# Patient Record
Sex: FEMALE | Race: WHITE | NOT HISPANIC OR LATINO | Employment: PART TIME | URBAN - METROPOLITAN AREA
[De-identification: names, ages, dates, MRNs, and addresses within clinical notes are randomized per-mention and may not be internally consistent; named-entity substitution may affect disease eponyms.]

---

## 2017-09-12 ENCOUNTER — ALLSCRIPTS OFFICE VISIT (OUTPATIENT)
Dept: OTHER | Facility: OTHER | Age: 62
End: 2017-09-12

## 2017-09-12 DIAGNOSIS — M99.83 OTHER BIOMECHANICAL LESIONS OF LUMBAR REGION: ICD-10-CM

## 2017-09-12 DIAGNOSIS — E66.01 MORBID (SEVERE) OBESITY DUE TO EXCESS CALORIES (HCC): ICD-10-CM

## 2017-09-12 DIAGNOSIS — L73.9 FOLLICULAR DISORDER: ICD-10-CM

## 2017-09-12 DIAGNOSIS — E07.9 DISORDER OF THYROID: ICD-10-CM

## 2017-11-01 DIAGNOSIS — E07.9 DISORDER OF THYROID: ICD-10-CM

## 2018-01-14 VITALS
WEIGHT: 263.13 LBS | BODY MASS INDEX: 39.88 KG/M2 | DIASTOLIC BLOOD PRESSURE: 78 MMHG | SYSTOLIC BLOOD PRESSURE: 140 MMHG | HEIGHT: 68 IN

## 2018-02-21 DIAGNOSIS — E03.9 HYPOTHYROIDISM, UNSPECIFIED TYPE: Primary | ICD-10-CM

## 2018-02-21 RX ORDER — LEVOTHYROXINE SODIUM 0.1 MG/1
100 TABLET ORAL DAILY
Qty: 90 TABLET | Refills: 1 | Status: SHIPPED | OUTPATIENT
Start: 2018-02-21 | End: 2018-12-19 | Stop reason: ALTCHOICE

## 2018-02-21 NOTE — TELEPHONE ENCOUNTER
Pt  States she does not feel the same on the Levothyroxine, wants brand name Synthroid 100 mcg to [facility], please sign/auth Rx

## 2018-03-09 DIAGNOSIS — E07.9 THYROID DISORDER: Primary | ICD-10-CM

## 2018-03-09 PROBLEM — E66.01 MORBID OBESITY (HCC): Status: ACTIVE | Noted: 2017-09-12

## 2018-03-09 PROBLEM — M48.061 LUMBAR FORAMINAL STENOSIS: Status: ACTIVE | Noted: 2017-09-12

## 2018-03-09 PROBLEM — H35.413 BILATERAL RETINAL LATTICE DEGENERATION: Status: ACTIVE | Noted: 2017-09-12

## 2018-03-09 RX ORDER — THYROID,PORK 15 MG
TABLET ORAL
Qty: 30 TABLET | Refills: 0 | Status: SHIPPED | OUTPATIENT
Start: 2018-03-09 | End: 2018-04-09 | Stop reason: SDUPTHER

## 2018-04-09 DIAGNOSIS — E07.9 THYROID DISORDER: ICD-10-CM

## 2018-04-10 RX ORDER — THYROID,PORK 15 MG
TABLET ORAL
Qty: 30 TABLET | Refills: 6 | Status: SHIPPED | OUTPATIENT
Start: 2018-04-10 | End: 2018-11-06 | Stop reason: SDUPTHER

## 2018-11-06 DIAGNOSIS — E07.9 THYROID DISORDER: ICD-10-CM

## 2018-11-06 RX ORDER — THYROID,PORK 15 MG
TABLET ORAL
Qty: 30 TABLET | Refills: 1 | Status: SHIPPED | OUTPATIENT
Start: 2018-11-06 | End: 2018-12-17 | Stop reason: SDUPTHER

## 2018-11-06 NOTE — TELEPHONE ENCOUNTER
Looks like the patient has not been here since September of last year  She is going to need an updated office visit  I will refill this prescription  Also it looks like a ordered blood work but I do not see it immediately in the chart it may have been done at [address] [place] [address] and the interface did not bring it into the new system  Can we check all scripts to see if lab was done?

## 2018-11-12 DIAGNOSIS — E07.9 THYROID DISORDER: Primary | ICD-10-CM

## 2018-11-12 NOTE — TELEPHONE ENCOUNTER
SCHEDULED FOR 12/17/18 and mailed lab slip for patient to have done prior to appointment      JANENE

## 2018-12-06 ENCOUNTER — APPOINTMENT (OUTPATIENT)
Dept: LAB | Age: 63
End: 2018-12-06
Payer: COMMERCIAL

## 2018-12-06 DIAGNOSIS — E07.9 THYROID DISORDER: ICD-10-CM

## 2018-12-06 LAB
ALBUMIN SERPL BCP-MCNC: 3.7 G/DL (ref 3.5–5)
ALP SERPL-CCNC: 78 U/L (ref 46–116)
ALT SERPL W P-5'-P-CCNC: 66 U/L (ref 12–78)
ANION GAP SERPL CALCULATED.3IONS-SCNC: 5 MMOL/L (ref 4–13)
AST SERPL W P-5'-P-CCNC: 40 U/L (ref 5–45)
BILIRUB SERPL-MCNC: 0.59 MG/DL (ref 0.2–1)
BUN SERPL-MCNC: 11 MG/DL (ref 5–25)
CALCIUM SERPL-MCNC: 9.7 MG/DL (ref 8.3–10.1)
CHLORIDE SERPL-SCNC: 105 MMOL/L (ref 100–108)
CHOLEST SERPL-MCNC: 237 MG/DL (ref 50–200)
CO2 SERPL-SCNC: 29 MMOL/L (ref 21–32)
CREAT SERPL-MCNC: 0.74 MG/DL (ref 0.6–1.3)
GFR SERPL CREATININE-BSD FRML MDRD: 86 ML/MIN/1.73SQ M
GLUCOSE P FAST SERPL-MCNC: 91 MG/DL (ref 65–99)
HDLC SERPL-MCNC: 48 MG/DL (ref 40–60)
LDLC SERPL CALC-MCNC: 154 MG/DL (ref 0–100)
NONHDLC SERPL-MCNC: 189 MG/DL
POTASSIUM SERPL-SCNC: 3.9 MMOL/L (ref 3.5–5.3)
PROT SERPL-MCNC: 7.1 G/DL (ref 6.4–8.2)
SODIUM SERPL-SCNC: 139 MMOL/L (ref 136–145)
T3FREE SERPL-MCNC: 2.92 PG/ML (ref 2.3–4.2)
T4 FREE SERPL-MCNC: 0.87 NG/DL (ref 0.76–1.46)
TRIGL SERPL-MCNC: 177 MG/DL
TSH SERPL DL<=0.05 MIU/L-ACNC: 3.45 UIU/ML (ref 0.36–3.74)

## 2018-12-06 PROCEDURE — 80053 COMPREHEN METABOLIC PANEL: CPT

## 2018-12-06 PROCEDURE — 80061 LIPID PANEL: CPT

## 2018-12-06 PROCEDURE — 84443 ASSAY THYROID STIM HORMONE: CPT

## 2018-12-06 PROCEDURE — 84481 FREE ASSAY (FT-3): CPT

## 2018-12-06 PROCEDURE — 84439 ASSAY OF FREE THYROXINE: CPT

## 2018-12-06 PROCEDURE — 36415 COLL VENOUS BLD VENIPUNCTURE: CPT

## 2018-12-17 ENCOUNTER — OFFICE VISIT (OUTPATIENT)
Dept: FAMILY MEDICINE CLINIC | Facility: CLINIC | Age: 63
End: 2018-12-17
Payer: COMMERCIAL

## 2018-12-17 VITALS
WEIGHT: 267 LBS | HEIGHT: 66 IN | SYSTOLIC BLOOD PRESSURE: 140 MMHG | BODY MASS INDEX: 42.91 KG/M2 | DIASTOLIC BLOOD PRESSURE: 80 MMHG

## 2018-12-17 DIAGNOSIS — E07.9 THYROID DISORDER: ICD-10-CM

## 2018-12-17 DIAGNOSIS — E66.01 MORBID OBESITY (HCC): ICD-10-CM

## 2018-12-17 DIAGNOSIS — Z23 ENCOUNTER FOR VACCINATION: ICD-10-CM

## 2018-12-17 DIAGNOSIS — M48.061 LUMBAR FORAMINAL STENOSIS: Primary | ICD-10-CM

## 2018-12-17 PROCEDURE — 3008F BODY MASS INDEX DOCD: CPT | Performed by: FAMILY MEDICINE

## 2018-12-17 PROCEDURE — 1036F TOBACCO NON-USER: CPT | Performed by: FAMILY MEDICINE

## 2018-12-17 PROCEDURE — 99214 OFFICE O/P EST MOD 30 MIN: CPT | Performed by: FAMILY MEDICINE

## 2018-12-17 RX ORDER — UBIDECARENONE 75 MG
CAPSULE ORAL
COMMUNITY

## 2018-12-17 RX ORDER — LEVOTHYROXINE AND LIOTHYRONINE 9.5; 2.25 UG/1; UG/1
15 TABLET ORAL DAILY
Qty: 110 TABLET | Refills: 0 | Status: SHIPPED | OUTPATIENT
Start: 2018-12-17 | End: 2019-05-29 | Stop reason: ALTCHOICE

## 2018-12-17 RX ORDER — ALBUTEROL SULFATE 90 UG/1
AEROSOL, METERED RESPIRATORY (INHALATION)
COMMUNITY
Start: 2014-02-24

## 2018-12-17 RX ORDER — LEVOTHYROXINE AND LIOTHYRONINE 9.5; 2.25 UG/1; UG/1
1 TABLET ORAL DAILY
COMMUNITY
Start: 2017-11-03 | End: 2018-12-17 | Stop reason: SDUPTHER

## 2018-12-17 RX ORDER — THIAMINE HCL 100 MG
TABLET ORAL
COMMUNITY

## 2018-12-17 RX ORDER — MELATONIN: COMMUNITY

## 2018-12-17 RX ORDER — ERGOCALCIFEROL (VITAMIN D2) 10 MCG
TABLET ORAL
COMMUNITY

## 2019-01-16 ENCOUNTER — TELEPHONE (OUTPATIENT)
Dept: FAMILY MEDICINE CLINIC | Facility: CLINIC | Age: 64
End: 2019-01-16

## 2019-01-17 ENCOUNTER — OFFICE VISIT (OUTPATIENT)
Dept: FAMILY MEDICINE CLINIC | Facility: CLINIC | Age: 64
End: 2019-01-17
Payer: COMMERCIAL

## 2019-01-17 VITALS
BODY MASS INDEX: 41.75 KG/M2 | DIASTOLIC BLOOD PRESSURE: 82 MMHG | HEIGHT: 67 IN | SYSTOLIC BLOOD PRESSURE: 138 MMHG | TEMPERATURE: 98.1 F | WEIGHT: 266 LBS

## 2019-01-17 DIAGNOSIS — J06.9 ACUTE URI: Primary | ICD-10-CM

## 2019-01-17 PROCEDURE — 1036F TOBACCO NON-USER: CPT | Performed by: NURSE PRACTITIONER

## 2019-01-17 PROCEDURE — 3008F BODY MASS INDEX DOCD: CPT | Performed by: NURSE PRACTITIONER

## 2019-01-17 PROCEDURE — 99213 OFFICE O/P EST LOW 20 MIN: CPT | Performed by: NURSE PRACTITIONER

## 2019-01-17 RX ORDER — DOXYCYCLINE 100 MG/1
100 TABLET ORAL 2 TIMES DAILY
Qty: 14 TABLET | Refills: 0 | Status: SHIPPED | OUTPATIENT
Start: 2019-01-17 | End: 2019-01-24

## 2019-01-17 RX ORDER — PREDNISONE 20 MG/1
40 TABLET ORAL DAILY
Qty: 6 TABLET | Refills: 0 | Status: SHIPPED | OUTPATIENT
Start: 2019-01-17 | End: 2019-01-20

## 2019-01-17 RX ORDER — DOXYCYCLINE 100 MG/1
100 TABLET ORAL 2 TIMES DAILY
Qty: 14 TABLET | Refills: 0 | Status: SHIPPED | OUTPATIENT
Start: 2019-01-17 | End: 2019-01-17 | Stop reason: SDUPTHER

## 2019-01-17 NOTE — PROGRESS NOTES
Assessment/Plan:    Acute URI  Will start patient on prednisone burst 40 mg daily for 3 days  It patient is to increase fluid intake  If patient has no improvement symptoms or any worsening symptoms over the next 1-2 days she can start doxycycline 100 mg twice daily for 7 days  Handout given to patient  Advised to use plain Mucinex  Please call the office if you are experiencing any worsening of symptoms or no symptom improvement  Diagnoses and all orders for this visit:    Acute URI  -     predniSONE 20 mg tablet; Take 2 tablets (40 mg total) by mouth daily for 3 days  -     Discontinue: doxycycline (ADOXA) 100 MG tablet; Take 1 tablet (100 mg total) by mouth 2 (two) times a day for 7 days  -     doxycycline (ADOXA) 100 MG tablet; Take 1 tablet (100 mg total) by mouth 2 (two) times a day for 7 days      Patient verbalizes understand and agrees with treatment plan  Subjective:        Patient ID: Dereck Roberts is a 58 y o  female  Chief Complaint   Patient presents with    Cough     with sinus pressure; rhinorhea, and body aches; started Tuesday       Patient presents to office today for upper respiratory infection symptoms  Patient states that she started feeling sick on Tuesday with body aches and got worse during the night  Patient states that she has lot of sinus pressure and tenderness  Patient states she has a productive cough  She is afraid that is moving to her chest  OTC medication has not been helpful so far  She states she feels slightly better today than yesterday  The following portions of the patient's history were reviewed and updated as appropriate: allergies, current medications, past family history, past social history and problem list     Review of Systems   Constitutional: Positive for chills  Negative for fever  HENT: Positive for congestion, ear pain (right side), postnasal drip, rhinorrhea, sinus pain and sinus pressure   Negative for ear discharge and sore throat  Eyes: Negative for pain and visual disturbance  Respiratory: Positive for cough and wheezing  Negative for chest tightness and shortness of breath  Cardiovascular: Negative for chest pain, palpitations and leg swelling  Gastrointestinal: Negative for abdominal pain, diarrhea, nausea and vomiting  Genitourinary: Negative for difficulty urinating and dysuria  Musculoskeletal: Positive for myalgias  Negative for arthralgias  Skin: Negative for color change and rash  Neurological: Negative for dizziness, syncope, numbness and headaches  Hematological: Negative for adenopathy  Psychiatric/Behavioral: Negative for agitation and behavioral problems  The patient is not nervous/anxious  Objective:  /82 (BP Location: Left arm, Patient Position: Sitting, Cuff Size: Large)   Temp 98 1 °F (36 7 °C) (Tympanic)   Ht 5' 6 5" (1 689 m)   Wt 121 kg (266 lb)   BMI 42 29 kg/m²      Physical Exam   Constitutional: She is oriented to person, place, and time  She appears well-developed  No distress  Morbidly obese   HENT:   Head: Normocephalic and atraumatic  Right Ear: External ear normal  No drainage, swelling or tenderness  Tympanic membrane is retracted  Tympanic membrane is not perforated and not erythematous  No middle ear effusion  Left Ear: External ear normal  No drainage, swelling or tenderness  Tympanic membrane is retracted  Tympanic membrane is not perforated and not erythematous  No middle ear effusion  Nose: Rhinorrhea present  Right sinus exhibits frontal sinus tenderness  Right sinus exhibits no maxillary sinus tenderness  Left sinus exhibits frontal sinus tenderness  Left sinus exhibits no maxillary sinus tenderness  Mouth/Throat: Posterior oropharyngeal erythema present  No oropharyngeal exudate or posterior oropharyngeal edema  Eyes: Conjunctivae and lids are normal  Right eye exhibits no discharge  Left eye exhibits no discharge     Neck: Normal range of motion  Neck supple  No tracheal deviation present  Cardiovascular: Normal rate and regular rhythm  No murmur heard  Pulmonary/Chest: Effort normal and breath sounds normal  No respiratory distress  She has no wheezes  Abdominal: Soft  Bowel sounds are normal  She exhibits no distension  There is no tenderness  There is no guarding  Musculoskeletal: Normal range of motion  She exhibits no edema or deformity  Lymphadenopathy:     She has no cervical adenopathy  Neurological: She is alert and oriented to person, place, and time  Coordination normal    Skin: Skin is warm and dry  No rash noted  She is not diaphoretic  No erythema  Psychiatric: She has a normal mood and affect  Her speech is normal and behavior is normal  Judgment and thought content normal  Cognition and memory are normal    Nursing note and vitals reviewed

## 2019-01-17 NOTE — PATIENT INSTRUCTIONS
Start prednisone, this is the steroid  40mg daily for 3 days  Take with food  It's better to take it earlier in the day than later as it could keep you up at night  Do not mix with NSAIDs such as aleve, ibuprofen, advil, motrin  Increase fluids  Use plain mucinex  If no improvement or any worsening symptoms over next 1-2 days, start doxycyline 100 mg twice daily for 7 days  Please call the office if you are experiencing any worsening of symptoms or no symptom improvement  Upper Respiratory Infection   AMBULATORY CARE:   An upper respiratory infection  is also called a common cold  It can affect your nose, throat, ears, and sinuses  Common signs and symptoms include the following:  Cold symptoms are usually worst for the first 3 to 5 days  You may have any of the following:  · Runny or stuffy nose    · Sneezing and coughing    · Sore throat or hoarseness    · Red, watery, and sore eyes    · Fatigue     · Chills and fever    · Headache, body aches, or sore muscles  Seek care immediately if:   · You have chest pain or trouble breathing  Contact your healthcare provider if:   · You have a fever over 102ºF (39°C)  · Your sore throat gets worse or you see white or yellow spots in your throat  · Your symptoms get worse after 3 to 5 days or your cold is not better in 14 days  · You have a rash anywhere on your skin  · You have large, tender lumps in your neck  · You have thick, green or yellow drainage from your nose  · You cough up thick yellow, green, or bloody mucus  · You have vomiting for more than 24 hours and cannot keep fluids down  · You have a bad earache  · You have questions or concerns about your condition or care  Treatment for a cold: There is no cure for the common cold  Colds are caused by viruses and do not get better with antibiotics  Most people get better in 7 to 14 days  You may continue to cough for 2 to 3 weeks   The following may help decrease your symptoms:  · Decongestants  help reduce nasal congestion and help you breathe more easily  If you take decongestant pills, they may make you feel restless or not able to sleep  Do not use decongestant sprays for more than a few days  · Cough suppressants  help reduce coughing  Ask your healthcare provider which type of cough medicine is best for you  · NSAIDs , such as ibuprofen, help decrease swelling, pain, and fever  NSAIDs can cause stomach bleeding or kidney problems in certain people  If you take blood thinner medicine, always ask your healthcare provider if NSAIDs are safe for you  Always read the medicine label and follow directions  · Acetaminophen  decreases pain and fever  It is available without a doctor's order  Ask how much to take and how often to take it  Follow directions  Read the labels of all other medicines you are using to see if they also contain acetaminophen, or ask your doctor or pharmacist  Acetaminophen can cause liver damage if not taken correctly  Do not use more than 4 grams (4,000 milligrams) total of acetaminophen in one day  Manage your cold:   · Rest as much as possible  Slowly start to do more each day  · Drink more liquids as directed  Liquids will help thin and loosen mucus so you can cough it up  Liquids will also help prevent dehydration  Liquids that help prevent dehydration include water, fruit juice, and broth  Do not drink liquids that contain caffeine  Caffeine can increase your risk for dehydration  Ask your healthcare provider how much liquid to drink each day  · Soothe a sore throat  Gargle with warm salt water  This helps your sore throat feel better  Make salt water by dissolving ¼ teaspoon salt in 1 cup warm water  You may also suck on hard candy or throat lozenges  You may use a sore throat spray  · Use a humidifier or vaporizer  Use a cool mist humidifier or a vaporizer to increase air moisture in your home   This may make it easier for you to breathe and help decrease your cough  · Use saline nasal drops as directed  These help relieve congestion  · Apply petroleum-based jelly around the outside of your nostrils  This can decrease irritation from blowing your nose  · Do not smoke  Nicotine and other chemicals in cigarettes and cigars can make your symptoms worse  They can also cause infections such as bronchitis or pneumonia  Ask your healthcare provider for information if you currently smoke and need help to quit  E-cigarettes or smokeless tobacco still contain nicotine  Talk to your healthcare provider before you use these products  Prevent spreading your cold to others:   · Try to stay away from other people during the first 2 to 3 days of your cold when it is more easily spread  · Do not share food or drinks  · Do not share hand towels with household members  · Wash your hands often, especially after you blow your nose  Turn away from other people and cover your mouth and nose with a tissue when you sneeze or cough  Follow up with your healthcare provider as directed:  Write down your questions so you remember to ask them during your visits  © 2017 [facility] Information is for End User's use only and may not be sold, redistributed or otherwise used for commercial purposes  All illustrations and images included in CareNotes® are the copyrighted property of A D A M , Inc  or [employer] Gonzalez  The above information is an  only  It is not intended as medical advice for individual conditions or treatments  Talk to your doctor, nurse or pharmacist before following any medical regimen to see if it is safe and effective for you

## 2019-01-18 DIAGNOSIS — Z12.39 SCREENING FOR BREAST CANCER: Primary | ICD-10-CM

## 2019-01-24 ENCOUNTER — TELEPHONE (OUTPATIENT)
Dept: FAMILY MEDICINE CLINIC | Facility: CLINIC | Age: 64
End: 2019-01-24

## 2019-01-24 NOTE — TELEPHONE ENCOUNTER
Patient's ear is blocked, she feels weak and is going through hot/cold flashes  She finished the antibiotic and and is wondering if there is anything that can be done

## 2019-01-24 NOTE — TELEPHONE ENCOUNTER
Pt refuses to come in due to not being able to afford the co pay and visit bill  Fred ramesh pt is to try flonase and to update us tomorrow afternoon

## 2019-04-16 DIAGNOSIS — Z12.11 SCREEN FOR COLON CANCER: Primary | ICD-10-CM

## 2019-05-29 ENCOUNTER — OFFICE VISIT (OUTPATIENT)
Dept: FAMILY MEDICINE CLINIC | Facility: CLINIC | Age: 64
End: 2019-05-29
Payer: COMMERCIAL

## 2019-05-29 VITALS
HEIGHT: 66 IN | HEART RATE: 97 BPM | WEIGHT: 259 LBS | BODY MASS INDEX: 41.62 KG/M2 | DIASTOLIC BLOOD PRESSURE: 78 MMHG | OXYGEN SATURATION: 95 % | SYSTOLIC BLOOD PRESSURE: 128 MMHG

## 2019-05-29 DIAGNOSIS — E78.01 FAMILIAL HYPERCHOLESTEROLEMIA: ICD-10-CM

## 2019-05-29 DIAGNOSIS — F34.1 DYSTHYMIA: ICD-10-CM

## 2019-05-29 DIAGNOSIS — Z00.00 ENCOUNTER FOR WELL ADULT EXAM WITHOUT ABNORMAL FINDINGS: ICD-10-CM

## 2019-05-29 DIAGNOSIS — E07.9 THYROID DISORDER: Primary | ICD-10-CM

## 2019-05-29 DIAGNOSIS — Z23 ENCOUNTER FOR VACCINATION: ICD-10-CM

## 2019-05-29 PROCEDURE — 1101F PT FALLS ASSESS-DOCD LE1/YR: CPT | Performed by: FAMILY MEDICINE

## 2019-05-29 PROCEDURE — 99214 OFFICE O/P EST MOD 30 MIN: CPT | Performed by: FAMILY MEDICINE

## 2019-05-29 PROCEDURE — 86580 TB INTRADERMAL TEST: CPT

## 2019-05-29 PROCEDURE — 3008F BODY MASS INDEX DOCD: CPT | Performed by: FAMILY MEDICINE

## 2019-05-29 RX ORDER — LEVOTHYROXINE SODIUM 0.1 MG/1
TABLET ORAL
COMMUNITY
End: 2019-05-29 | Stop reason: SDUPTHER

## 2019-05-29 RX ORDER — LEVOTHYROXINE SODIUM 0.05 MG/1
50 TABLET ORAL 2 TIMES DAILY
Qty: 180 TABLET | Refills: 1 | Status: SHIPPED | OUTPATIENT
Start: 2019-05-29 | End: 2020-01-16 | Stop reason: SDUPTHER

## 2019-05-31 ENCOUNTER — CLINICAL SUPPORT (OUTPATIENT)
Dept: FAMILY MEDICINE CLINIC | Facility: CLINIC | Age: 64
End: 2019-05-31

## 2019-05-31 DIAGNOSIS — Z11.1 ENCOUNTER FOR PPD SKIN TEST READING: Primary | ICD-10-CM

## 2019-05-31 LAB
INDURATION: 0 MM
TB SKIN TEST: NEGATIVE

## 2019-10-10 ENCOUNTER — OFFICE VISIT (OUTPATIENT)
Dept: FAMILY MEDICINE CLINIC | Facility: CLINIC | Age: 64
End: 2019-10-10
Payer: COMMERCIAL

## 2019-10-10 VITALS
TEMPERATURE: 98.9 F | DIASTOLIC BLOOD PRESSURE: 82 MMHG | SYSTOLIC BLOOD PRESSURE: 124 MMHG | OXYGEN SATURATION: 99 % | HEART RATE: 79 BPM | WEIGHT: 263 LBS | HEIGHT: 66 IN | BODY MASS INDEX: 42.27 KG/M2

## 2019-10-10 DIAGNOSIS — J06.9 ACUTE URI: Primary | ICD-10-CM

## 2019-10-10 PROCEDURE — 3008F BODY MASS INDEX DOCD: CPT | Performed by: NURSE PRACTITIONER

## 2019-10-10 PROCEDURE — 99213 OFFICE O/P EST LOW 20 MIN: CPT | Performed by: NURSE PRACTITIONER

## 2019-10-10 RX ORDER — DOXYCYCLINE 100 MG/1
100 TABLET ORAL 2 TIMES DAILY
Qty: 14 TABLET | Refills: 0 | Status: SHIPPED | OUTPATIENT
Start: 2019-10-10 | End: 2019-10-17

## 2019-10-10 RX ORDER — PREDNISONE 20 MG/1
40 TABLET ORAL DAILY
Qty: 6 TABLET | Refills: 0 | Status: SHIPPED | OUTPATIENT
Start: 2019-10-10 | End: 2019-10-13

## 2019-10-10 NOTE — PATIENT INSTRUCTIONS
Start prednisone, this is the steroid  40mg daily for 3 days  Take with food  It's better to take it earlier in the day than later as it could keep you up at night  Do not mix with NSAIDs such as aleve, ibuprofen, advil, motrin  If no improvement or worsening symptoms over next few days, Start antibiotic, this is doxycycline 100 mg twice daily for 7 days  Take antibiotic with food  If you take any vitamin supplements, try to separate dose by 4-6 hours as this may impair absorption of the antibiotic  Drink lots of water  Plain mucinex would be helpful  Please call the office if you are experiencing any worsening of symptoms or no symptom improvement

## 2019-10-10 NOTE — PROGRESS NOTES
Assessment/Plan:    Acute URI  Advised likely viral in etiology  dicussed symptom management  Start prednisone, this is the steroid  40mg daily for 3 days  Take with food  It's better to take it earlier in the day than later as it could keep you up at night  Do not mix with NSAIDs such as aleve, ibuprofen, advil, motrin  If no improvement or worsening symptoms over next few days, Start antibiotic, this is doxycycline 100 mg twice daily for 7 days  Take antibiotic with food  If you take any vitamin supplements, try to separate dose by 4-6 hours as this may impair absorption of the antibiotic  Drink lots of water  Plain mucinex would be helpful  Please call the office if you are experiencing any worsening of symptoms or no symptom improvement  Diagnoses and all orders for this visit:    Acute URI  -     doxycycline (ADOXA) 100 MG tablet; Take 1 tablet (100 mg total) by mouth 2 (two) times a day for 7 days  -     predniSONE 20 mg tablet; Take 2 tablets (40 mg total) by mouth daily for 3 days      Patient verbalizes understand and agrees with treatment plan  Subjective:        Patient ID: Sherry Christianson is a 71 y o  female  Chief Complaint   Patient presents with    Sore Throat     with fatigue and runny nose; no f/c/n/v/d indicated  symptoms started 2 days ago  taking Advil PM and Nyquil PRN       Patient presents with:  Sore Throat: with fatigue and runny nose; no f/c/n/v/d indicated  symptoms started 2 days ago  taking Advil PM and Nyquil PRN  She feels she usually puts illnesses off and they get worse so she is seeking care earlier this time  Every season she states she gets either ear infection/ bronchitis/ or sinus infection  Sore Throat    This is a new problem  The current episode started in the past 7 days  The problem has been gradually worsening  The pain is worse on the left side  There has been no fever  The pain is at a severity of 8/10   Associated symptoms include congestion and coughing  Pertinent negatives include no abdominal pain, diarrhea, ear pain, headaches, shortness of breath or vomiting  The following portions of the patient's history were reviewed and updated as appropriate: allergies, current medications, past family history, past social history and problem list     Review of Systems   Constitutional: Positive for fatigue  Negative for chills and fever  HENT: Positive for congestion, rhinorrhea, sinus pressure, sinus pain and sore throat  Negative for ear pain  Eyes: Negative for pain and visual disturbance  Respiratory: Positive for cough  Negative for shortness of breath  Cardiovascular: Negative for chest pain, palpitations and leg swelling  Gastrointestinal: Negative for abdominal pain, diarrhea, nausea and vomiting  Genitourinary: Negative for difficulty urinating and dysuria  Musculoskeletal: Negative for arthralgias and myalgias  Skin: Negative for color change and rash  Neurological: Negative for dizziness, syncope, numbness and headaches  Hematological: Negative for adenopathy  Psychiatric/Behavioral: Negative for agitation and behavioral problems  The patient is not nervous/anxious  Objective:  /82 (BP Location: Left arm, Patient Position: Sitting, Cuff Size: Standard) Comment (BP Location): lower arm  Pulse 79   Temp 98 9 °F (37 2 °C) (Temporal)   Ht 5' 6 25" (1 683 m)   Wt 119 kg (263 lb)   SpO2 99%   BMI 42 13 kg/m²      Physical Exam   Constitutional: She is oriented to person, place, and time  She appears well-developed  No distress  Morbidly obese   HENT:   Head: Normocephalic and atraumatic  Right Ear: External ear normal  Tympanic membrane is bulging  Left Ear: External ear normal  Tympanic membrane is bulging  Nose: Right sinus exhibits maxillary sinus tenderness  Right sinus exhibits no frontal sinus tenderness  Left sinus exhibits maxillary sinus tenderness   Left sinus exhibits no frontal sinus tenderness  Mouth/Throat: Posterior oropharyngeal erythema present  No oropharyngeal exudate or posterior oropharyngeal edema  Tonsils are 2+ on the right  Tonsils are 2+ on the left  No tonsillar exudate  Eyes: Conjunctivae and lids are normal  Right eye exhibits no discharge  Left eye exhibits no discharge  Neck: Neck supple  No tracheal deviation present  Cardiovascular: Normal rate and regular rhythm  No murmur heard  Pulmonary/Chest: Effort normal and breath sounds normal  No respiratory distress  She has no wheezes  Musculoskeletal: She exhibits no edema or deformity  Lymphadenopathy:     She has no cervical adenopathy  Neurological: She is alert and oriented to person, place, and time  Skin: Skin is warm and dry  No rash noted  She is not diaphoretic  No erythema  Psychiatric: She has a normal mood and affect  Her speech is normal and behavior is normal  Judgment and thought content normal  Cognition and memory are normal    Nursing note and vitals reviewed

## 2019-11-22 ENCOUNTER — TELEPHONE (OUTPATIENT)
Dept: FAMILY MEDICINE CLINIC | Facility: CLINIC | Age: 64
End: 2019-11-22

## 2019-11-22 NOTE — TELEPHONE ENCOUNTER
Jena Mccord called the office she was trying to avoid doing physical therapy for her back issues it was originally  ordered but it   Dr Johnston can you please re order physical therapy for pt  Per pt she does not know if it will help but she is thinking she may have to give it a try  Pt is going to call her insuracne to see if she can go to [address] [place] [address] for physical therapy pt will call back to let us know   Pt's number is [phone] Additional Primary Defect Length (In Cm): 0.8

## 2019-11-27 NOTE — TELEPHONE ENCOUNTER
Charlesetta Mortimer returned the office's call she called her insuracne and they gave her 3 different names for where she can go forphysical therapy  Pt stated that they recommended Dr Kuo in the area and that they would need to request all of her records for what has been done with her back in the past informed pt she would need to sign a records release  Pt stated that financially she can not afford the co-pays  So she would like to hold off at this time she was asking whom Dr Johnston originally referred her to and if Dr Johnston knows if regis morgan will cover physical therapy at [facility]  I informed her I do not believe Dr Johnston would be able to answer this  I informed her that her  insurance would be the one whom would know where she may go to have physical therapy done and where it would be covered  Pt would like to hold off at this time  Pt has been doing physical therapy off of [facility] and using her stability ball if anything changes and per pt if she loses more mobility she will call and  let us know if she would like to move forward with physical therapy

## 2019-11-27 NOTE — TELEPHONE ENCOUNTER
lmom requesting pt please return the office's call  I need to see if pt called and asked her insurance if they will cover her to go to [facility] for physical therapy

## 2019-12-12 NOTE — PROGRESS NOTES
Assessment/Plan:     Diagnoses and all orders for this visit:    Lumbar foraminal stenosis  -     Ambulatory referral to Physical Therapy; Future    Thyroid disorder  -     thyroid (ARMOUR THYROID) 15 MG tablet; Take 1 tablet (15 mg total) by mouth daily Take extra half Monday Wednesday Friday  -     TSH, 3rd generation with Free T4 reflex; Future  -     T4, free; Future  -     T3, free; Future  -     T3, reverse; Future    Encounter for vaccination  -     TDAP VACCINE GREATER THAN OR EQUAL TO 8YO IM  -     PREFERRED: influenza vaccine, [id number], quadrivalent, recombinant, PF, 0 5 mL, for patients 18 yr+ ([facility])    Morbid obesity ([address] )    Other orders  -     ASTRAGALUS PO; Take by mouth  -     Multiple Vitamin (DAILY VALUE MULTIVITAMIN) TABS; Take by mouth  -     Magnesium 500 MG TABS; Take by mouth  -     Glucosamine-Chondroitin (OSTEO BI-FLEX REGULAR STRENGTH) 250-200 MG TABS; Take by mouth  -     Selenium 200 MCG CAPS; Take by oral route   -     GINSENG PO; Take by mouth  -     TURMERIC PO; turmeric (bulk)  -     albuterol (VENTOLIN HFA) 90 mcg/act inhaler; inhale 2 puff by inhalation route  every 4 - 6 hours as needed  -     cyanocobalamin (VITAMIN B-12) 100 mcg tablet; Take by mouth  -     cholecalciferol (VITAMIN D3) 1,000 units tablet; Take by mouth  -     Discontinue: thyroid (ARMOUR THYROID) 15 MG tablet; Take 1 tablet by mouth daily        1  Hypothyroid-patient is trending more hypo and adjust them it on her T3 agent is done today  Follow-up lab work in 3 months  2  History of degenerative lumbar disc-trial of ambulatory physical therapy and then if no success then refer to other chronic pain or back Orthopedic  3  Morbid obesity-discussed [id number] calorie portion control healthy intake  Exercises difficult due to back pain  Hopefully we can achieve improvement in that area so she can get back moving  Formal follow-up pending results of physical therapy and lab work    Time spent greater than 25 minutes with greater than 50% counseling performed  Subjective:   Chief Complaint   Patient presents with    Follow-up     here to go over labs  Has been having leg issues also, restless legs she states  Patient ID: Meena Pichardo is a 58 y o  female  Patient is a pleasant 27-year-old female who is here for follow-up and review labs  She has several complaints which will be outlined  The most significant issue is back pain  There is a copy of an MRI done about almost 2 years ago ordered by Augusto Aden with [facility]  Findings at that time indicated multilevel degenerative changes worse at L5-S1 where there was a broad-based disc bulge with degenerative changes of the facets  There was bilateral neural foraminal stenosis right greater than left  Patient also had repeat blood work done comparison to September of 2013 through [facility] labs  Lipid profile is improved  Hemoglobin A1c at that time was 5 3%, TSH was 1 0 free T4 was 1 66 and free T3 was 3 78  LDL was 209, triglycerides 183 and HDL of 49 and total cholesterol of 295  TSH comparatively to last year is looking trending more sluggish  Patient is not doing levothyroxine but has been doing Armor thyroid  She takes multiple supplements  Along with back pain she is having restless leg type symptoms  She notes that the did not have any formal physical therapy for her back symptoms  She is definitely interested in that  There are no notes  From [facility] that are in her record  We were not seeing her at that time     Patient declines flu or tetanus booster  The following portions of the patient's his tory were reviewed and updated as appropriate: allergies, current medications, past family history, past medical history, past social history, past surgical history and problem list       Review of Systems   Constitutional: Positive for fatigue  HENT: Negative for sinus pain  Eyes: Negative for visual disturbance     Respiratory: Negative for cough and shortness of breath  Cardiovascular: Negative for leg swelling  Gastrointestinal: Negative  Genitourinary: Negative  Musculoskeletal: Positive for arthralgias, back pain and gait problem (Patient's leg earlier bothersome when she 1st gets up and then when she does too much)  Creaky sound left knee  Some bilateral radicular symptoms but not all the way down to ankle         Objective:  /80   Ht 5' 6" (1 676 m)   Wt 121 kg (267 lb)   BMI 43 09 kg/m²        Physical Exam   Constitutional: She is oriented to person, place, and time  She appears well-developed  Pleasant 13-year-old female who appears her stated age with a BMI of 43%   Eyes: Pupils are equal, round, and reactive to light  Neck: Normal range of motion  Cardiovascular: Normal rate, regular rhythm and intact distal pulses  No carotid, femoral or abdominal bruits   Pulmonary/Chest: Effort normal    Abdominal: Soft  Bowel sounds are normal    Musculoskeletal:   Negative straight leg raising but very tight hamstrings right greater than left some discomfort with external rotation in the iliotibial band right greater than left   Neurological: She is alert and oriented to person, place, and time  She displays normal reflexes  She exhibits normal muscle tone  Psychiatric: She has a normal mood and affect   Her behavior is normal  Judgment and thought content normal  Stable  Trend BMP  Avoid nephrotoxins

## 2019-12-27 ENCOUNTER — TELEPHONE (OUTPATIENT)
Dept: FAMILY MEDICINE CLINIC | Facility: CLINIC | Age: 64
End: 2019-12-27

## 2019-12-27 NOTE — TELEPHONE ENCOUNTER
Charlesetta Mortimer called the office in regards to her back has taken a turn for the negative  She is losing mobility she can not walk as good as she used to and has excruciating pain and pt was advised to start with physical therapy originally  by Dr Johnston  pt can not afford the $40 copay a few times a week  Pt last had an MRI in 2016 and  pt believes that something else is going on with her back pt is requesting if you can consult with Dr Johnston since she is out of the office can you order pt an MRI  Per pt Dr Johnston knows the situation she has a herniated  disc and degenerative  Pt would need an open Air MRI  Pt's number is [phone]       Dr Garcia pt aware Dr Johnston is currently out of the office  And all messages are going out for you to address and you can discuss with Dr Johnston if needed  Pt understood we will call hero once we have an answer pt understands she may not hear today

## 2019-12-30 NOTE — TELEPHONE ENCOUNTER
THIS IS LUMBAR MRI FROM 1/2017 , so almost 3 years ago----    At L2-3 there is minute disc bulging slightly paracentral to the right  There  is mild right neural foraminal stenosis  At L3-4 there is a mild disc bulging  There is mild bilateral neural foraminal  narrowing  At L4-5 there is a posterior disc bulge with perhaps a small right  extraforaminal zone disc protrusion  There are degenerative changes of the  facets  There is bilateral neural foraminal stenosis, right greater than left  At L5-S1 there is an anterior disc herniation  There is a broad-based posterior  disc bulge with degenerative changes of the facets  There is severe right  greater than left neural foraminal stenosis  There is some increased STIR  signal along the lateral aspect of the endplates likely representing some type  I degenerative endplate changes  There may be a subacute/acute change  IS pain radicular? ? Meaning down leg, which legs?

## 2020-01-16 DIAGNOSIS — E07.9 THYROID DISORDER: ICD-10-CM

## 2020-01-16 NOTE — TELEPHONE ENCOUNTER
Patient called back stating she still has not gotten her labs drawn as she is trying to get the best pricing as she has a large out of pocket pay but does need a refill

## 2020-01-17 RX ORDER — LEVOTHYROXINE SODIUM 0.05 MG/1
50 TABLET ORAL 2 TIMES DAILY
Qty: 180 TABLET | Refills: 1 | Status: SHIPPED | OUTPATIENT
Start: 2020-01-17 | End: 2020-06-11

## 2020-02-10 DIAGNOSIS — Z12.39 BREAST CANCER SCREENING: Primary | ICD-10-CM

## 2020-02-26 ENCOUNTER — TELEPHONE (OUTPATIENT)
Dept: FAMILY MEDICINE CLINIC | Facility: CLINIC | Age: 65
End: 2020-02-26

## 2020-02-26 NOTE — TELEPHONE ENCOUNTER
Patient went to [facility] for her foot and ankle issues  She was told by the doctor there that she needs a complete reconstruction of her foot and ankle  Recovery time is 6 months  She would like a second opinion  Who do you recommend?     [phone]

## 2020-02-26 NOTE — TELEPHONE ENCOUNTER
Kd Sharp MD with [facility]  His office is at the [facility] scan  He is the physician that did my foot surgery  He comes highly recommended    Definitely worth a 2nd opinion from him

## 2020-02-27 ENCOUNTER — APPOINTMENT (OUTPATIENT)
Dept: LAB | Age: 65
End: 2020-02-27
Payer: COMMERCIAL

## 2020-02-27 DIAGNOSIS — E07.9 THYROID DISORDER: ICD-10-CM

## 2020-02-27 DIAGNOSIS — E78.01 FAMILIAL HYPERCHOLESTEROLEMIA: ICD-10-CM

## 2020-02-27 LAB
ALBUMIN SERPL BCP-MCNC: 3.8 G/DL (ref 3.5–5)
ALP SERPL-CCNC: 79 U/L (ref 46–116)
ALT SERPL W P-5'-P-CCNC: 31 U/L (ref 12–78)
ANION GAP SERPL CALCULATED.3IONS-SCNC: 5 MMOL/L (ref 4–13)
AST SERPL W P-5'-P-CCNC: 24 U/L (ref 5–45)
BILIRUB SERPL-MCNC: 0.71 MG/DL (ref 0.2–1)
BUN SERPL-MCNC: 16 MG/DL (ref 5–25)
CALCIUM SERPL-MCNC: 9.4 MG/DL (ref 8.3–10.1)
CHLORIDE SERPL-SCNC: 109 MMOL/L (ref 100–108)
CHOLEST SERPL-MCNC: 287 MG/DL (ref 50–200)
CO2 SERPL-SCNC: 27 MMOL/L (ref 21–32)
CREAT SERPL-MCNC: 0.82 MG/DL (ref 0.6–1.3)
GFR SERPL CREATININE-BSD FRML MDRD: 75 ML/MIN/1.73SQ M
GLUCOSE P FAST SERPL-MCNC: 99 MG/DL (ref 65–99)
HDLC SERPL-MCNC: 53 MG/DL
LDLC SERPL CALC-MCNC: 216 MG/DL (ref 0–100)
POTASSIUM SERPL-SCNC: 4.2 MMOL/L (ref 3.5–5.3)
PROT SERPL-MCNC: 7.4 G/DL (ref 6.4–8.2)
SODIUM SERPL-SCNC: 141 MMOL/L (ref 136–145)
T3FREE SERPL-MCNC: 2.2 PG/ML (ref 2.3–4.2)
T4 FREE SERPL-MCNC: 1.27 NG/DL (ref 0.76–1.46)
TRIGL SERPL-MCNC: 91 MG/DL
TSH SERPL DL<=0.05 MIU/L-ACNC: 1.48 UIU/ML (ref 0.36–3.74)

## 2020-02-27 PROCEDURE — 80061 LIPID PANEL: CPT

## 2020-02-27 PROCEDURE — 84439 ASSAY OF FREE THYROXINE: CPT

## 2020-02-27 PROCEDURE — 36415 COLL VENOUS BLD VENIPUNCTURE: CPT

## 2020-02-27 PROCEDURE — 84481 FREE ASSAY (FT-3): CPT

## 2020-02-27 PROCEDURE — 84443 ASSAY THYROID STIM HORMONE: CPT

## 2020-02-27 PROCEDURE — 80053 COMPREHEN METABOLIC PANEL: CPT

## 2020-03-04 NOTE — TELEPHONE ENCOUNTER
Patient called the office this morning and was given the information  She is going to call to make an appt

## 2020-06-11 DIAGNOSIS — E07.9 THYROID DISORDER: ICD-10-CM

## 2020-06-11 RX ORDER — LEVOTHYROXINE SODIUM 0.05 MG/1
50 TABLET ORAL 2 TIMES DAILY
Qty: 180 TABLET | Refills: 1 | Status: SHIPPED | OUTPATIENT
Start: 2020-06-11 | End: 2020-06-12 | Stop reason: SDUPTHER

## 2020-06-12 RX ORDER — LEVOTHYROXINE SODIUM 0.1 MG/1
100 TABLET ORAL DAILY
Qty: 90 TABLET | Refills: 2 | OUTPATIENT
Start: 2020-06-12

## 2020-06-12 RX ORDER — LEVOTHYROXINE SODIUM 0.05 MG/1
50 TABLET ORAL 2 TIMES DAILY
Qty: 180 TABLET | Refills: 1 | Status: SHIPPED | OUTPATIENT
Start: 2020-06-12 | End: 2021-03-05

## 2020-06-12 NOTE — TELEPHONE ENCOUNTER
Pt called the office back and states that she does not want to take the 100mcg because they put a dye in the 100mcg that is not in the 50mcg  So pt would like to stay at the 50mcg twice daily   Please advise, thanks

## 2021-03-05 DIAGNOSIS — E07.9 THYROID DISORDER: ICD-10-CM

## 2021-03-05 RX ORDER — LEVOTHYROXINE SODIUM 0.05 MG/1
TABLET ORAL
Qty: 180 TABLET | Refills: 1 | Status: SHIPPED | OUTPATIENT
Start: 2021-03-05

## 2021-06-25 ENCOUNTER — VBI (OUTPATIENT)
Dept: ADMINISTRATIVE | Facility: OTHER | Age: 66
End: 2021-06-25

## 2024-02-13 ENCOUNTER — TELEPHONE (OUTPATIENT)
Age: 69
End: 2024-02-13

## 2024-02-13 NOTE — TELEPHONE ENCOUNTER
Patient asked if your office offer the 2 step tuberculin skin test. Patient's place of employment is requiring it. Patient would like a return call at [phone]. Please advise.

## 2024-02-13 NOTE — TELEPHONE ENCOUNTER
I called and spoke with the patient and made her aware that we do, do the 2 step PPD in the office. She will verify with employer and give us a call back to schedule if needed.

## 2024-02-15 ENCOUNTER — TELEPHONE (OUTPATIENT)
Age: 69
End: 2024-02-15

## 2024-02-15 NOTE — TELEPHONE ENCOUNTER
Patient called in and was confused on how the PPD placemen was scheduled. For the 1st and 2nd placement.    Please call Ara back at .    Thank you

## 2024-02-20 ENCOUNTER — CLINICAL SUPPORT (OUTPATIENT)
Dept: FAMILY MEDICINE CLINIC | Facility: CLINIC | Age: 69
End: 2024-02-20
Payer: COMMERCIAL

## 2024-02-20 DIAGNOSIS — Z11.1 SCREENING FOR TUBERCULOSIS: Primary | ICD-10-CM

## 2024-02-20 PROCEDURE — 86580 TB INTRADERMAL TEST: CPT

## 2024-02-22 ENCOUNTER — CLINICAL SUPPORT (OUTPATIENT)
Dept: FAMILY MEDICINE CLINIC | Facility: CLINIC | Age: 69
End: 2024-02-22

## 2024-02-22 DIAGNOSIS — Z11.1 SCREENING FOR TUBERCULOSIS: Primary | ICD-10-CM

## 2024-02-22 LAB
INDURATION: 0 MM
TB SKIN TEST: NEGATIVE

## 2024-03-05 ENCOUNTER — CLINICAL SUPPORT (OUTPATIENT)
Dept: FAMILY MEDICINE CLINIC | Facility: CLINIC | Age: 69
End: 2024-03-05
Payer: COMMERCIAL

## 2024-03-05 ENCOUNTER — RA CDI HCC (OUTPATIENT)
Dept: OTHER | Facility: HOSPITAL | Age: 69
End: 2024-03-05

## 2024-03-05 DIAGNOSIS — Z11.1 ENCOUNTER FOR PPD TEST: Primary | ICD-10-CM

## 2024-03-05 PROCEDURE — 86580 TB INTRADERMAL TEST: CPT

## 2024-03-05 NOTE — PROGRESS NOTES
F32.A Depression- found in active problem list - Can Depression be further classified using more specific code     HCC coding opportunities          Chart Reviewed number of suggestions sent to Provider: 1     Patients Insurance        Commercial Insurance: [employer]

## 2024-03-07 ENCOUNTER — CLINICAL SUPPORT (OUTPATIENT)
Dept: FAMILY MEDICINE CLINIC | Facility: CLINIC | Age: 69
End: 2024-03-07

## 2024-03-07 DIAGNOSIS — Z11.1 ENCOUNTER FOR PPD TEST: Primary | ICD-10-CM

## 2024-03-07 LAB
INDURATION: 0 MM
TB SKIN TEST: NEGATIVE

## 2024-03-11 ENCOUNTER — TELEPHONE (OUTPATIENT)
Age: 69
End: 2024-03-11

## 2024-03-11 NOTE — TELEPHONE ENCOUNTER
Patient asking for her dog to become an emotional support animal. Asking how to go about this. She will need him to travel with her in a year to [place] on a train.

## 2024-04-29 ENCOUNTER — TELEPHONE (OUTPATIENT)
Age: 69
End: 2024-04-29

## 2024-04-29 NOTE — TELEPHONE ENCOUNTER
Patient calling to discuss a bill for her TB read. She was billed $24 for her nurse visit to have her TB shot read. Advised I would have to transfer her call to a clerical member of the team to discuss. Upon warm transfer call was placed on hold for 7 minutes. When call was answered call disconnected. Called back and answered by Cele who told me to have patient bring a copy of her bill to the practice. I advised Cele she would have to relay this information tot he patient. She agreed and warm transfer was completed at this time.

## 2024-05-22 ENCOUNTER — TELEPHONE (OUTPATIENT)
Dept: OTHER | Facility: OTHER | Age: 69
End: 2024-05-22

## 2024-05-22 ENCOUNTER — TELEPHONE (OUTPATIENT)
Dept: FAMILY MEDICINE CLINIC | Facility: CLINIC | Age: 69
End: 2024-05-22

## 2024-05-22 ENCOUNTER — CLINICAL SUPPORT (OUTPATIENT)
Dept: FAMILY MEDICINE CLINIC | Facility: CLINIC | Age: 69
End: 2024-05-22
Payer: COMMERCIAL

## 2024-05-22 DIAGNOSIS — R39.9 UTI SYMPTOMS: Primary | ICD-10-CM

## 2024-05-22 LAB
SL AMB  POCT GLUCOSE, UA: ABNORMAL
SL AMB LEUKOCYTE ESTERASE,UA: ABNORMAL
SL AMB POCT BILIRUBIN,UA: ABNORMAL
SL AMB POCT BLOOD,UA: 1
SL AMB POCT CLARITY,UA: CLEAR
SL AMB POCT COLOR,UA: YELLOW
SL AMB POCT KETONES,UA: ABNORMAL
SL AMB POCT NITRITE,UA: ABNORMAL
SL AMB POCT PH,UA: 6
SL AMB POCT SPECIFIC GRAVITY,UA: 1.02
SL AMB POCT URINE PROTEIN: ABNORMAL
SL AMB POCT UROBILINOGEN: 0.2

## 2024-05-22 PROCEDURE — 87086 URINE CULTURE/COLONY COUNT: CPT | Performed by: FAMILY MEDICINE

## 2024-05-22 PROCEDURE — 81003 URINALYSIS AUTO W/O SCOPE: CPT

## 2024-05-22 PROCEDURE — 99211 OFF/OP EST MAY X REQ PHY/QHP: CPT

## 2024-05-22 RX ORDER — NITROFURANTOIN 25; 75 MG/1; MG/1
100 CAPSULE ORAL 2 TIMES DAILY
Qty: 6 CAPSULE | Refills: 0 | Status: SHIPPED | OUTPATIENT
Start: 2024-05-22 | End: 2024-05-25

## 2024-05-22 NOTE — TELEPHONE ENCOUNTER
----- Message from Nahed Venegas DO sent at 5/22/2024  4:20 PM EDT -----  Regarding: urine symptoms  Urine culture is sent as directed.  Will send in empiric 3-day treatment until final cultures.

## 2024-05-22 NOTE — TELEPHONE ENCOUNTER
FYI: patient is coming in for a nurse visit today  to give a urine sample. Patient believes she has a UTI.   Patient stated that she has a lot of pressure with frequent urination and a slight burn feeling.

## 2024-05-23 LAB — BACTERIA UR CULT: NORMAL

## 2024-05-24 ENCOUNTER — TELEPHONE (OUTPATIENT)
Age: 69
End: 2024-05-24

## 2024-05-24 NOTE — TELEPHONE ENCOUNTER
Patient asking for her urine lab results. Please review. She also states she was not supposed to be given the Macrobid due to her age. Asking if she should be on something else.

## 2024-08-02 ENCOUNTER — TELEPHONE (OUTPATIENT)
Dept: FAMILY MEDICINE CLINIC | Facility: CLINIC | Age: 69
End: 2024-08-02

## 2024-08-02 NOTE — TELEPHONE ENCOUNTER
I called and made the patient aware she verbalized understanding. She will go and have it looked at

## 2024-08-02 NOTE — TELEPHONE ENCOUNTER
Patient has a possible tick bite that she would like to get look at. She stated it is red and swollen. Please advise.

## 2024-10-09 ENCOUNTER — OFFICE VISIT (OUTPATIENT)
Dept: FAMILY MEDICINE CLINIC | Facility: CLINIC | Age: 69
End: 2024-10-09
Payer: COMMERCIAL

## 2024-10-09 VITALS
HEART RATE: 84 BPM | DIASTOLIC BLOOD PRESSURE: 80 MMHG | TEMPERATURE: 97.8 F | WEIGHT: 252 LBS | HEIGHT: 66 IN | RESPIRATION RATE: 16 BRPM | BODY MASS INDEX: 40.5 KG/M2 | OXYGEN SATURATION: 98 % | SYSTOLIC BLOOD PRESSURE: 142 MMHG

## 2024-10-09 DIAGNOSIS — R79.89 T3 LOW IN SERUM: ICD-10-CM

## 2024-10-09 DIAGNOSIS — E78.01 FAMILIAL HYPERCHOLESTEROLEMIA: ICD-10-CM

## 2024-10-09 DIAGNOSIS — R00.2 PALPITATIONS: Primary | ICD-10-CM

## 2024-10-09 DIAGNOSIS — E07.9 THYROID DISORDER: ICD-10-CM

## 2024-10-09 DIAGNOSIS — E66.01 CLASS 3 SEVERE OBESITY DUE TO EXCESS CALORIES WITH BODY MASS INDEX (BMI) OF 40.0 TO 44.9 IN ADULT, UNSPECIFIED WHETHER SERIOUS COMORBIDITY PRESENT (HCC): ICD-10-CM

## 2024-10-09 DIAGNOSIS — E66.813 CLASS 3 SEVERE OBESITY DUE TO EXCESS CALORIES WITH BODY MASS INDEX (BMI) OF 40.0 TO 44.9 IN ADULT, UNSPECIFIED WHETHER SERIOUS COMORBIDITY PRESENT (HCC): ICD-10-CM

## 2024-10-09 DIAGNOSIS — I49.8 SINUS ARRHYTHMIA: ICD-10-CM

## 2024-10-09 DIAGNOSIS — R35.1 NOCTURIA: ICD-10-CM

## 2024-10-09 PROCEDURE — 93000 ELECTROCARDIOGRAM COMPLETE: CPT | Performed by: FAMILY MEDICINE

## 2024-10-09 PROCEDURE — 99214 OFFICE O/P EST MOD 30 MIN: CPT | Performed by: FAMILY MEDICINE

## 2024-10-09 NOTE — PROGRESS NOTES
Ambulatory Visit  Name: Ara Cabrales      : 1955      MRN: [id number]  Encounter Provider: Parmjit Bruce DO  Encounter Date: 10/9/2024   Encounter department: [facility]  Chief Complaint   Patient presents with    Follow-up     Pt states she starts w/ heart palpitations when she has to urinate at night . Pt calls it urine dumping      Patient Instructions   Check updated labs and see Cardiology for palpitations and sinus arrhythmia and elevated BP and also endo for abnormal thyroid labs in past. Hx of nocturia and rec checking for diabetes. Recheck BP in 1 month for BP check for elevated BP today. Wants to do lifestyle changes and try to lose weight.   Chief Complaint   Patient presents with    Follow-up     Pt states she starts w/ heart palpitations when she has to urinate at night . Pt calls it urine dumping      Patient Instructions   Check updated labs and see Cardiology for palpitations and sinus arrhythmia and elevated BP and also endo for abnormal thyroid labs in past. Hx of nocturia and rec checking for diabetes. Recheck BP in 1 month for BP check for elevated BP today. Wants to do lifestyle changes and try to lose weight.     Assessment & Plan  Thyroid disorder    Orders:    Ambulatory Referral to Endocrinology; Future    Comprehensive metabolic panel; Future    TSH, 3rd generation; Future    T4, free; Future    T3 low in serum    Orders:    Ambulatory Referral to Endocrinology; Future    Comprehensive metabolic panel; Future    TSH, 3rd generation; Future    T4, free; Future    Palpitations    Orders:    Comprehensive metabolic panel; Future    Hemoglobin A1C; Future    TSH, 3rd generation; Future    T4, free; Future    Ambulatory Referral to Cardiology; Future    Familial hypercholesterolemia    Orders:    Comprehensive metabolic panel; Future    Lipid Panel with Direct LDL reflex; Future    Ambulatory Referral to Cardiology; Future    Class 3 severe obesity due to  excess calories with body mass index (BMI) of 40.0 to 44.9 in adult, unspecified whether serious comorbidity present ([facility])      Orders:    Comprehensive metabolic panel; Future    Hemoglobin A1C; Future    Lipid Panel with Direct LDL reflex; Future    TSH, 3rd generation; Future    T4, free; Future    Ambulatory Referral to Cardiology; Future    Nocturia         Sinus arrhythmia    Orders:    Ambulatory Referral to Cardiology; Future    [unfilled]  History of Present Illness     Follow-up (Pt states she starts w/ heart palpitations when she has to urinate at night . Pt calls it urine dumping )         History obtained from : patient  Review of Systems   Constitutional: Negative.    HENT: Negative.     Eyes: Negative.    Respiratory: Negative.     Cardiovascular:  Positive for palpitations.   Gastrointestinal: Negative.    Endocrine: Negative.    Genitourinary: Negative.    Musculoskeletal: Negative.    Skin: Negative.    Allergic/Immunologic: Negative.    Neurological: Negative.    Hematological: Negative.    Psychiatric/Behavioral: Negative.       Current Outpatient Medications on File Prior to Visit   Medication Sig Dispense Refill    cholecalciferol (VITAMIN D3) 1,000 units tablet Take by mouth      cyanocobalamin (VITAMIN B-12) 100 mcg tablet Take by mouth      GINSENG PO Take by mouth      Glucosamine-Chondroitin (OSTEO BI-FLEX REGULAR STRENGTH) 250-200 MG TABS Take by mouth      Magnesium 500 MG TABS Take by mouth      Multiple Vitamin (DAILY VALUE MULTIVITAMIN) TABS Take by mouth      Selenium 200 MCG CAPS Take by oral route.      TURMERIC PO turmeric (bulk)      albuterol (VENTOLIN HFA) 90 mcg/act inhaler inhale 2 puff by inhalation route  every 4 - 6 hours as needed (Patient not taking: Reported on 10/9/2024)      ASTRAGALUS PO Take by mouth (Patient not taking: Reported on 10/9/2024)      levothyroxine 50 mcg tablet TAKE 1 TABLET BY MOUTH TWICE A DAY (Patient not taking: Reported on 10/9/2024) 180  "tablet 1     No current facility-administered medications on file prior to visit.          Objective     /80   Pulse 84   Temp 97.8 °F (36.6 °C) (Temporal)   Resp 16   Ht 5' 6\" (1.676 m)   Wt 114 kg (252 lb)   SpO2 98%   BMI 40.67 kg/m²     Physical Exam  Constitutional:       Appearance: She is well-developed. She is obese.   HENT:      Head: Normocephalic and atraumatic.      Right Ear: External ear normal.      Left Ear: External ear normal.      Nose: Nose normal.   Eyes:      Conjunctiva/sclera: Conjunctivae normal.      Pupils: Pupils are equal, round, and reactive to light.   Cardiovascular:      Rate and Rhythm: Normal rate and regular rhythm.      Pulses: Normal pulses.      Heart sounds: Normal heart sounds.   Pulmonary:      Effort: Pulmonary effort is normal.      Breath sounds: Normal breath sounds.   Musculoskeletal:         General: Normal range of motion.      Cervical back: Normal range of motion and neck supple.   Skin:     General: Skin is warm and dry.      Capillary Refill: Capillary refill takes less than 2 seconds.   Neurological:      General: No focal deficit present.      Mental Status: She is alert and oriented to person, place, and time. Mental status is at baseline.      Deep Tendon Reflexes: Reflexes are normal and symmetric.   Psychiatric:         Mood and Affect: Mood normal.         Behavior: Behavior normal.         Thought Content: Thought content normal.         Judgment: Judgment normal.       Administrative Statements   I have spent a total time of 30 minutes in caring for this patient on the day of the visit/encounter including Diagnostic results, Prognosis, Risks and benefits of tx options, Instructions for management, Patient and family education, Importance of tx compliance, Risk factor reductions, Impressions, Counseling / Coordination of care, Documenting in the medical record, Reviewing / ordering tests, medicine, procedures  , and Obtaining or reviewing " history  .

## 2024-10-09 NOTE — ASSESSMENT & PLAN NOTE
Orders:    Ambulatory Referral to Endocrinology; Future    Comprehensive metabolic panel; Future    TSH, 3rd generation; Future    T4, free; Future

## 2024-10-09 NOTE — PATIENT INSTRUCTIONS
Check updated labs and see Cardiology for palpitations and sinus arrhythmia and elevated BP and also endo for abnormal thyroid labs in past. Hx of nocturia and rec checking for diabetes. Recheck BP in 1 month for BP check for elevated BP today. Wants to do lifestyle changes and try to lose weight.

## 2024-10-09 NOTE — ASSESSMENT & PLAN NOTE
Orders:    Comprehensive metabolic panel; Future    Lipid Panel with Direct LDL reflex; Future    Ambulatory Referral to Cardiology; Future

## 2024-10-11 ENCOUNTER — NURSE TRIAGE (OUTPATIENT)
Age: 69
End: 2024-10-11

## 2024-10-11 NOTE — TELEPHONE ENCOUNTER
"Reason for Disposition   History of hyperthyroidism or taking thyroid medication    Answer Assessment - Initial Assessment Questions  1. DESCRIPTION: \"Please describe your heart rate or heartbeat that you are having\" (e.g., fast/slow, regular/irregular, skipped or extra beats, \"palpitations\")      Patient does not monitor heart rate  2. ONSET: \"When did it start?\" (Minutes, hours or days)       Has been ongoing  3. DURATION: \"How long does it last\" (e.g., seconds, minutes, hours)      Lasts a few hours during the night.  4. PATTERN \"Does it come and go, or has it been constant since it started?\"  \"Does it get worse with exertion?\"   \"Are you feeling it now?\"      Not feeling it now.    6. HEART RATE: \"Can you tell me your heart rate?\" \"How many beats in 15 seconds?\"  (Note: not all patients can do this)        Patient does not monitor BP or HR at home  7       8. CAUSE: \"What do you think is causing the palpitations?\"      Abnormal EKG per patient as discussed with PCP at  two days ago     10. OTHER SYMPTOMS: \"Do you have any other symptoms?\" (e.g., dizziness, chest pain, sweating, difficulty breathing)        Other symptom is frequent urination during the night when she has the palpitations.    Protocols used: Heart Rate and Heartbeat Questions-ADULT-[place]    "

## 2024-10-14 NOTE — PROGRESS NOTES
"Advanced Heart Failure/Pulmonary Hypertension Outpatient Note - Ara Cabrales 69 y.o. female MRN: [id number]    @ Encounter: [id number]      Assessment:  69 y.o. female PMH and acute problems listed later in this note (a partial list may also be included within 'assessment' section) presents for consultation.  I first met Ara Cabrales on 10/15/24.  Referred by Parmjit Bruce DO.     Intentional weight loss 25 lbs in last few months  Sinus arrhythmia  nocturia  Patient Active Problem List   Diagnosis    Bilateral retinal lattice degeneration    Lumbar foraminal stenosis    Morbid obesity (HCC)    Thyroid disorder    Depression    Hyperlipidemia         Today I have reviewed all pertinent labs/imaging/data including but not limited to:    No results for input(s): \"CREATININE\" in the last 88306 hours.      Lab Results   Component Value Date    K 4.2 02/27/2020     No results found for: \"HGBA1C\"  Lab Results   Component Value Date    XEV4MZJCHGRQ 1.480 02/27/2020     Lab Results   Component Value Date    LDLCALC 216 (H) 02/27/2020     No results found for: \"BNP\"   No results found for: \"NTBNP\"       TODAY'S PLAN:     10/15/24  I am meeting patient for the first time today  +palpitations just prior to her nighttime 'urine dumping' episodes  Ecg reassuring, sinus arrhythmia    Urgent referral for palpitations    Check holter now    Fu PCP for mngmt of possible DM, thyroid dz, HTN  Multiple screening labs sent but not yet completed - pt plans to get them doen in near future    Applauded her efforts at weight loss and getting healthy now    Follow up:  With the general cardiology team in 6 weeks. This patient does not have advanced heart failure needs at present.  In addition to follow up with their other medical providers    Studies:  Today I have reviewed all pertinent patient data/labs/imaging where available, including but not limited to the below studies. This includes my independent interpretation. Selected results " may be displayed here but comprehensive listing is omitted for note clarity and can be found in the epic chart.    ECG.    Echo.    Stress.    Cath.    HPI:   69 y.o. female PMH and acute problems listed later in this note (a partial list may also be included within 'assessment' section) presents for consultation.  No new CP/SOB/dizziness/syncope.  No new fatigue.  No new unintentional weight changes.  No new leg swelling, PND, pillow orthopnea.  No new fevers, chills, cough, nausea, vomiting, diarrhea, dysuria.        ROS:  10 point ROS negative except as specified in HPI    No past medical history on file.  Patient Active Problem List   Diagnosis    Bilateral retinal lattice degeneration    Lumbar foraminal stenosis    Morbid obesity (HCC)    Thyroid disorder    Depression    Hyperlipidemia     No current facility-administered medications for this visit.      Allergies   Allergen Reactions    Sulfa Antibiotics Anaphylaxis and Hives     Category: Allergy;     Nuts - Food Allergy Hives     Category: Allergy; Annotation - [id number]: Tree nuts     Social History     Socioeconomic History    Marital status: /Civil Union     Spouse name: Not on file    Number of children: Not on file    Years of education: Not on file    Highest education level: Not on file   Occupational History    Occupation: employed   Tobacco Use    Smoking status: Former    Smokeless tobacco: Never    Tobacco comments:     over 30 years ago   Vaping Use    Vaping status: Never Used   Substance and Sexual Activity    Alcohol use: No    Drug use: No    Sexual activity: Not on file   Other Topics Concern    Not on file   Social History Narrative    Not on file     Social Determinants of Health     Financial Resource Strain: Not on file   Food Insecurity: Not on file   Transportation Needs: Not on file   Physical Activity: Not on file   Stress: Not on file   Social Connections: Not on file   Intimate Partner Violence: Not on file   Housing  "Stability: Not on file     Family History   Problem Relation Age of Onset    Gout Mother     Celiac disease Mother     Heart attack Maternal Grandfather     Diabetes type II Family     Gestational diabetes Sister        Physical Exam:  Vitals:    10/15/24 1108   BP: 142/92   BP Location: Right arm   Patient Position: Sitting   Cuff Size: Large   Pulse: 73   SpO2: 100%   Weight: 115 kg (253 lb 6.4 oz)   Height: 5' 6\" (1.676 m)     Constitutional: NAD, non toxic, obese  Ears/nose/mouth/throat: atraumatic  CV: RRR, nl S1S2, no murmurs/rubs/gallups, no JVD, no HJR  Resp: CTABL  GI: Soft, NTND  MSK: no swollen joints in exposed areas  Extr: No edema, warm LE  Pysche: Normal affect  Neuro: appropriate in conversation  Skin: dry and intact in exposed areas    Labs & Results:  No results found for: \"WBC\", \"HGB\", \"HCT\", \"MCV\", \"PLT\"  Lab Results   Component Value Date    SODIUM 141 02/27/2020    K 4.2 02/27/2020     (H) 02/27/2020    CO2 27 02/27/2020    BUN 16 02/27/2020    CREATININE 0.82 02/27/2020    CALCIUM 9.4 02/27/2020       Counseling / Coordination of Care  Greater than 50% of total time was spent with the patient and / or family counseling and / or coordination of care. Discussion included diagnoses, most recent studies and any changes in treatment.    Thank you for the opportunity to participate in the care of this patient.    Brandon Valeinte MD  Attending Physician  Advanced Heart Failure and Transplant Cardiology  [facility]  "

## 2024-10-15 ENCOUNTER — PROCEDURE VISIT (OUTPATIENT)
Dept: CARDIOLOGY CLINIC | Facility: CLINIC | Age: 69
End: 2024-10-15
Payer: COMMERCIAL

## 2024-10-15 ENCOUNTER — OFFICE VISIT (OUTPATIENT)
Dept: CARDIOLOGY CLINIC | Facility: CLINIC | Age: 69
End: 2024-10-15
Payer: COMMERCIAL

## 2024-10-15 VITALS
BODY MASS INDEX: 40.72 KG/M2 | OXYGEN SATURATION: 100 % | HEIGHT: 66 IN | WEIGHT: 253.4 LBS | SYSTOLIC BLOOD PRESSURE: 142 MMHG | DIASTOLIC BLOOD PRESSURE: 92 MMHG | HEART RATE: 73 BPM

## 2024-10-15 DIAGNOSIS — R00.2 PALPITATIONS: ICD-10-CM

## 2024-10-15 DIAGNOSIS — R00.2 PALPITATIONS: Primary | ICD-10-CM

## 2024-10-15 DIAGNOSIS — E66.813 CLASS 3 SEVERE OBESITY DUE TO EXCESS CALORIES WITH BODY MASS INDEX (BMI) OF 40.0 TO 44.9 IN ADULT, UNSPECIFIED WHETHER SERIOUS COMORBIDITY PRESENT (HCC): ICD-10-CM

## 2024-10-15 DIAGNOSIS — E66.01 CLASS 3 SEVERE OBESITY DUE TO EXCESS CALORIES WITH BODY MASS INDEX (BMI) OF 40.0 TO 44.9 IN ADULT, UNSPECIFIED WHETHER SERIOUS COMORBIDITY PRESENT (HCC): ICD-10-CM

## 2024-10-15 DIAGNOSIS — I49.8 SINUS ARRHYTHMIA: ICD-10-CM

## 2024-10-15 DIAGNOSIS — E78.01 FAMILIAL HYPERCHOLESTEROLEMIA: ICD-10-CM

## 2024-10-15 PROCEDURE — 99244 OFF/OP CNSLTJ NEW/EST MOD 40: CPT | Performed by: STUDENT IN AN ORGANIZED HEALTH CARE EDUCATION/TRAINING PROGRAM

## 2024-10-15 PROCEDURE — RECHECK: Performed by: STUDENT IN AN ORGANIZED HEALTH CARE EDUCATION/TRAINING PROGRAM

## 2024-10-15 NOTE — LETTER
"October 15, 2024     Parmjit Bruce DO  [address]  [address]  [facility] [address]    Patient: Ara Cabrales   YOB: 1955   Date of Visit: 10/15/2024       Dear Dr. Bruce:    Thank you for referring Ara Cabrales to me for evaluation. Below are my notes for this consultation.    If you have questions, please do not hesitate to call me. I look forward to following your patient along with you.         Sincerely,        Brandon Valiente MD        CC: No Recipients    Brandon Valiente MD  10/15/2024 11:43 AM  Sign when Signing Visit  Advanced Heart Failure/Pulmonary Hypertension Outpatient Note - Ara Cabrales 69 y.o. female MRN: [id number]    @ Encounter: [id number]      Assessment:  69 y.o. female PMH and acute problems listed later in this note (a partial list may also be included within 'assessment' section) presents for consultation.  I first met Ara Cabrales on 10/15/24.  Referred by Parmjit Bruce DO.     Intentional weight loss 25 lbs in last few months  Sinus arrhythmia  nocturia  Patient Active Problem List   Diagnosis   • Bilateral retinal lattice degeneration   • Lumbar foraminal stenosis   • Morbid obesity (HCC)   • Thyroid disorder   • Depression   • Hyperlipidemia         Today I have reviewed all pertinent labs/imaging/data including but not limited to:    No results for input(s): \"CREATININE\" in the last 15792 hours.      Lab Results   Component Value Date    K 4.2 02/27/2020     No results found for: \"HGBA1C\"  Lab Results   Component Value Date    [id number] 1.480 02/27/2020     Lab Results   Component Value Date    LDLCALC 216 (H) 02/27/2020     No results found for: \"BNP\"   No results found for: \"NTBNP\"       TODAY'S PLAN:     10/15/24  I am meeting patient for the first time today  +palpitations just prior to her nighttime 'urine dumping' episodes  Ecg reassuring, sinus arrhythmia    Urgent referral for palpitations    Check holter now    Fu PCP for mngmt of possible DM, thyroid dz, " HTN  Multiple screening labs sent but not yet completed - pt plans to get them doen in near future    Applauded her efforts at weight loss and getting healthy now    Follow up:  With the general cardiology team in 6 weeks. This patient does not have advanced heart failure needs at present.  In addition to follow up with their other medical providers    Studies:  Today I have reviewed all pertinent patient data/labs/imaging where available, including but not limited to the below studies. This includes my independent interpretation. Selected results may be displayed here but comprehensive listing is omitted for note clarity and can be found in the epic chart.    ECG.    Echo.    Stress.    Cath.    HPI:   69 y.o. female PMH and acute problems listed later in this note (a partial list may also be included within 'assessment' section) presents for consultation.  No new CP/SOB/dizziness/syncope.  No new fatigue.  No new unintentional weight changes.  No new leg swelling, PND, pillow orthopnea.  No new fevers, chills, cough, nausea, vomiting, diarrhea, dysuria.        ROS:  10 point ROS negative except as specified in HPI    No past medical history on file.  Patient Active Problem List   Diagnosis   • Bilateral retinal lattice degeneration   • Lumbar foraminal stenosis   • Morbid obesity (HCC)   • Thyroid disorder   • Depression   • Hyperlipidemia     No current facility-administered medications for this visit.      Allergies   Allergen Reactions   • Sulfa Antibiotics Anaphylaxis and Hives     Category: Allergy;    • Nuts - Food Allergy Hives     Category: Allergy; Annotation - [id number]: Tree nuts     Social History     Socioeconomic History   • Marital status: /Civil Union     Spouse name: Not on file   • Number of children: Not on file   • Years of education: Not on file   • Highest education level: Not on file   Occupational History   • Occupation: employed   Tobacco Use   • Smoking status: Former   • Smokeless  "tobacco: Never   • Tobacco comments:     over 30 years ago   Vaping Use   • Vaping status: Never Used   Substance and Sexual Activity   • Alcohol use: No   • Drug use: No   • Sexual activity: Not on file   Other Topics Concern   • Not on file   Social History Narrative   • Not on file     Social Determinants of Health     Financial Resource Strain: Not on file   Food Insecurity: Not on file   Transportation Needs: Not on file   Physical Activity: Not on file   Stress: Not on file   Social Connections: Not on file   Intimate Partner Violence: Not on file   Housing Stability: Not on file     Family History   Problem Relation Age of Onset   • Gout Mother    • Celiac disease Mother    • Heart attack Maternal Grandfather    • Diabetes type II Family    • Gestational diabetes Sister        Physical Exam:  Vitals:    10/15/24 1108   BP: 142/92   BP Location: Right arm   Patient Position: Sitting   Cuff Size: Large   Pulse: 73   SpO2: 100%   Weight: 115 kg (253 lb 6.4 oz)   Height: 5' 6\" (1.676 m)     Constitutional: NAD, non toxic, obese  Ears/nose/mouth/throat: atraumatic  CV: RRR, nl S1S2, no murmurs/rubs/gallups, no JVD, no HJR  Resp: CTABL  GI: Soft, NTND  MSK: no swollen joints in exposed areas  Extr: No edema, warm LE  Pysche: Normal affect  Neuro: appropriate in conversation  Skin: dry and intact in exposed areas    Labs & Results:  No results found for: \"WBC\", \"HGB\", \"HCT\", \"MCV\", \"PLT\"  Lab Results   Component Value Date    SODIUM 141 02/27/2020    K 4.2 02/27/2020     (H) 02/27/2020    CO2 27 02/27/2020    BUN 16 02/27/2020    CREATININE 0.82 02/27/2020    CALCIUM 9.4 02/27/2020       Counseling / Coordination of Care  Greater than 50% of total time was spent with the patient and / or family counseling and / or coordination of care. Discussion included diagnoses, most recent studies and any changes in treatment.    Thank you for the opportunity to participate in the care of this patient.    Brandon Valiente, " MD  Attending Physician  Advanced Heart Failure and Transplant Cardiology  [facility]

## 2024-11-06 ENCOUNTER — TELEPHONE (OUTPATIENT)
Age: 69
End: 2024-11-06

## 2024-11-06 NOTE — TELEPHONE ENCOUNTER
Reason for call:   [] Refill   [] Prior Auth  [x] Other: Pt called states she wants to get back on her thyroid medication, explained to pt that she needed to get her labs done asap so that if there is anything abnormal then the provider can address it. Pt aware. Pt has appt on 11/20/2024

## 2024-11-08 ENCOUNTER — APPOINTMENT (OUTPATIENT)
Dept: LAB | Age: 69
End: 2024-11-08
Payer: COMMERCIAL

## 2024-11-08 DIAGNOSIS — E78.01 FAMILIAL HYPERCHOLESTEROLEMIA: ICD-10-CM

## 2024-11-08 DIAGNOSIS — E66.01 CLASS 3 SEVERE OBESITY DUE TO EXCESS CALORIES WITH BODY MASS INDEX (BMI) OF 40.0 TO 44.9 IN ADULT, UNSPECIFIED WHETHER SERIOUS COMORBIDITY PRESENT (HCC): ICD-10-CM

## 2024-11-08 DIAGNOSIS — E66.813 CLASS 3 SEVERE OBESITY DUE TO EXCESS CALORIES WITH BODY MASS INDEX (BMI) OF 40.0 TO 44.9 IN ADULT, UNSPECIFIED WHETHER SERIOUS COMORBIDITY PRESENT (HCC): ICD-10-CM

## 2024-11-08 DIAGNOSIS — R00.2 PALPITATIONS: ICD-10-CM

## 2024-11-08 DIAGNOSIS — R79.89 T3 LOW IN SERUM: ICD-10-CM

## 2024-11-08 DIAGNOSIS — E07.9 THYROID DISORDER: ICD-10-CM

## 2024-11-08 LAB
ALBUMIN SERPL BCG-MCNC: 4.4 G/DL (ref 3.5–5)
ALP SERPL-CCNC: 59 U/L (ref 34–104)
ALT SERPL W P-5'-P-CCNC: 20 U/L (ref 7–52)
ANION GAP SERPL CALCULATED.3IONS-SCNC: 10 MMOL/L (ref 4–13)
AST SERPL W P-5'-P-CCNC: 21 U/L (ref 13–39)
BILIRUB SERPL-MCNC: 0.86 MG/DL (ref 0.2–1)
BUN SERPL-MCNC: 14 MG/DL (ref 5–25)
CALCIUM SERPL-MCNC: 9.3 MG/DL (ref 8.4–10.2)
CHLORIDE SERPL-SCNC: 104 MMOL/L (ref 96–108)
CHOLEST SERPL-MCNC: 251 MG/DL
CO2 SERPL-SCNC: 26 MMOL/L (ref 21–32)
CREAT SERPL-MCNC: 0.72 MG/DL (ref 0.6–1.3)
EST. AVERAGE GLUCOSE BLD GHB EST-MCNC: 123 MG/DL
GFR SERPL CREATININE-BSD FRML MDRD: 85 ML/MIN/1.73SQ M
GLUCOSE P FAST SERPL-MCNC: 99 MG/DL (ref 65–99)
HBA1C MFR BLD: 5.9 %
HDLC SERPL-MCNC: 54 MG/DL
LDLC SERPL CALC-MCNC: 179 MG/DL (ref 0–100)
POTASSIUM SERPL-SCNC: 4 MMOL/L (ref 3.5–5.3)
PROT SERPL-MCNC: 6.9 G/DL (ref 6.4–8.4)
SODIUM SERPL-SCNC: 140 MMOL/L (ref 135–147)
T4 FREE SERPL-MCNC: 0.81 NG/DL (ref 0.61–1.12)
TRIGL SERPL-MCNC: 90 MG/DL
TSH SERPL DL<=0.05 MIU/L-ACNC: 3.04 UIU/ML (ref 0.45–4.5)

## 2024-11-08 PROCEDURE — 84443 ASSAY THYROID STIM HORMONE: CPT

## 2024-11-08 PROCEDURE — 36415 COLL VENOUS BLD VENIPUNCTURE: CPT

## 2024-11-08 PROCEDURE — 83036 HEMOGLOBIN GLYCOSYLATED A1C: CPT

## 2024-11-08 PROCEDURE — 84439 ASSAY OF FREE THYROXINE: CPT

## 2024-11-08 PROCEDURE — 80061 LIPID PANEL: CPT

## 2024-11-08 PROCEDURE — 80053 COMPREHEN METABOLIC PANEL: CPT

## 2024-11-12 ENCOUNTER — TELEPHONE (OUTPATIENT)
Dept: FAMILY MEDICINE CLINIC | Facility: CLINIC | Age: 69
End: 2024-11-12

## 2024-11-12 ENCOUNTER — TELEPHONE (OUTPATIENT)
Age: 69
End: 2024-11-12

## 2024-11-12 NOTE — TELEPHONE ENCOUNTER
Patient calls to discuss her lab results I was able to review the PCP message:    ----- Message from Parmjit Bruce DO sent at 11/11/2024 12:05 PM EST -----  Please call the patient regarding her abnormal result. Prediabetic and rec low sugar and also ldl is very high and rec low cholesterol diet and see Cardiology as well for this and Rec starting statin if interested fort high cholesterol if interested or can see what Cardiology recommends for this as well as for hx of palpitations. Thyroid labs stable.     Patient was also asking about her Holter monitor results. I was able to report the results note from cardiology:    Brandon Valiente MD  11/11/2024  1:29 PM EST Back to Top      Please notify pt testing did not reveal any significant pathology. No overt evidence of serious arrhythmia. Reassuring results.        Thanks     - Brandon     I was able to report to the patient her results. I did discuss with the patient that her cholesterol and LDL are still very high despite her diet changes over the last year. Patient is currently declining statin therapy. Patient does have a follow up scheduled with cardiology.

## 2024-11-12 NOTE — TELEPHONE ENCOUNTER
Patient called back and results were read back.  She did have question and she was warm transferred to CTS.      She does want her lab results printed and she will be by tomorrow at 3-3:30pm to pick that up.    Pls have labs printed for her.

## 2024-11-12 NOTE — TELEPHONE ENCOUNTER
----- Message from Parmjit Bruce DO sent at 11/11/2024 12:05 PM EST -----  Please call the patient regarding her abnormal result. Prediabetic and rec low sugar and also ldl is very high and rec low cholesterol diet and see Cardiology as well for this and Rec starting statin if interested fort high cholesterol if interested or can see what Cardiology recommends for this as well as for hx of palpitations. Thyroid labs stable.

## 2024-11-13 ENCOUNTER — RA CDI HCC (OUTPATIENT)
Dept: OTHER | Facility: HOSPITAL | Age: 69
End: 2024-11-13

## 2024-11-15 ENCOUNTER — RESULTS FOLLOW-UP (OUTPATIENT)
Dept: CARDIOLOGY CLINIC | Facility: CLINIC | Age: 69
End: 2024-11-15

## 2024-11-15 NOTE — TELEPHONE ENCOUNTER
Called patient and gave results.       ----- Message from Brandon Valiente MD sent at 11/11/2024  1:29 PM EST -----  Please notify pt testing did not reveal any significant pathology. No overt evidence of serious arrhythmia. Reassuring results.      Thanks    - Brandon

## 2025-02-17 ENCOUNTER — TELEPHONE (OUTPATIENT)
Age: 70
End: 2025-02-17

## 2025-02-17 NOTE — TELEPHONE ENCOUNTER
Pt called again and asked if Dr. Venegas can please recommend any name band finger pulse monitors she can get on [employer]. Please advise [phone] thank you.

## 2025-02-17 NOTE — TELEPHONE ENCOUNTER
I do not know of any specific name brand, they all work about the same.  I would not spend tons of money

## 2025-02-17 NOTE — TELEPHONE ENCOUNTER
"Received call from patient with complaints of fluctuating pulse rate. States that she gets attacks between 1 am and 3 am of \"weird sensations\" and has been getting up in the middle of the night to check her BP and pulse rate. States that she has been having these episodes about every other night for a few months. Last night HR was 139 when she checked it at 3:30 am. She states that her BP was normal.  States that her HR is normal during the day but has spikes at night.   Advised that brief spikes in HR due to things like anxiety, exercise, etc are normal but that if her HR remains elevated for long periods of time (ie: over an hour) with rest, she should go to the ER for evaluation.  Patient states that she does not note it to be elevated for that long. She has been keeping detailed logs of her symptoms, vital signs and diet.     Patient has an appointment scheduled for 2/19 at 5:20 pm. Recommended that patient bring her logs to her appointment. Understanding verbalized.     "

## 2025-02-18 ENCOUNTER — RA CDI HCC (OUTPATIENT)
Dept: OTHER | Facility: HOSPITAL | Age: 70
End: 2025-02-18

## 2025-02-19 ENCOUNTER — OFFICE VISIT (OUTPATIENT)
Dept: FAMILY MEDICINE CLINIC | Facility: CLINIC | Age: 70
End: 2025-02-19
Payer: COMMERCIAL

## 2025-02-19 VITALS
WEIGHT: 242 LBS | BODY MASS INDEX: 39.06 KG/M2 | HEART RATE: 79 BPM | DIASTOLIC BLOOD PRESSURE: 80 MMHG | SYSTOLIC BLOOD PRESSURE: 124 MMHG

## 2025-02-19 DIAGNOSIS — Z13.6 SCREENING FOR HEART DISEASE: ICD-10-CM

## 2025-02-19 DIAGNOSIS — Z13.820 ENCOUNTER FOR OSTEOPOROSIS SCREENING IN ASYMPTOMATIC POSTMENOPAUSAL PATIENT: ICD-10-CM

## 2025-02-19 DIAGNOSIS — Z78.0 ENCOUNTER FOR OSTEOPOROSIS SCREENING IN ASYMPTOMATIC POSTMENOPAUSAL PATIENT: ICD-10-CM

## 2025-02-19 DIAGNOSIS — R00.2 PALPITATION: Primary | ICD-10-CM

## 2025-02-19 PROCEDURE — 99214 OFFICE O/P EST MOD 30 MIN: CPT | Performed by: FAMILY MEDICINE

## 2025-02-19 NOTE — PROGRESS NOTES
"Name: Ara Cabrales      : 1955      MRN: [id number]  Encounter Provider: Nahed Venegas DO  Encounter Date: 2025   Encounter department: [facility] PRIMARY [facility]  :  Assessment & Plan  Palpitation  Patient has had 48-hour Holter monitor performed in November of this past year which was unremarkable.  She also was seen by cardiology.  Orders:  •  Echo complete w/ contrast if indicated; Future    Screening for heart disease    Orders:  •  CT coronary calcium score; Future    Encounter for osteoporosis screening in asymptomatic postmenopausal patient     Orders:  •  DXA bone density spine hip and pelvis; Future      ___________________________________________________    Patient had lab work relatively recently in 2024 including normal thyroid, prediabetic hemoglobin A1c of 5.9%, essentially normal chemistries with regards to electrolytes.  Therefore, this symptom of \"urine dumping\" more than a year would be difficult to explain from the standpoint of normal chemistries 4 months ago.  Since patient had Holter monitor, check echo for structure/anatomy.  Recommend CT coronary calcium test for coronary anatomy and plaque secondary to elevated lipids and prediabetes.        Depression Screening and Follow-up Plan: Patient was screened for depression during today's encounter. They screened negative with a PHQ-9 score of 1.        History of Present Illness   HPI  Chief Complaint   Patient presents with   • Follow-up     Blood Pressure check.  She brought in her home BP monitor with her.  Lt arm checked was 142/77  Pt also needs to schedule an annual exam    Does not take any BP or other medication  Review of Systems   Respiratory:  Negative for shortness of breath.    Cardiovascular:  Positive for palpitations (flutter). Negative for chest pain.        Laying on L side, clammy, cold sweat, see phone call notes   Endocrine: Positive for polyuria. Negative for polydipsia.   Genitourinary:     " "    Patient reports episodes of \"urine dumping\".Night time for one year   Neurological:  Negative for headaches.   Psychiatric/Behavioral:  Positive for sleep disturbance (interruption).         Denies anxiety or stressors other than routine life things   Patient had called the office 2 days ago with complaints of fluctuating pulse rate.  Stating that she gets these \"attacks\" between 1 AM and 3 AM with \"weird sensations \"and then gets up in the middle of the night to check her blood pressure and pulse rate.  Patient states she has been getting these episodes about every other night for few months.  She states her heart rate is normal during the day but it spikes at night.  Patient making effort to \"quit sugar\".  Using intermittent fasting also, has dropped some weight.  Patient had  48  hour patient has had 48-hour Holter monitor Holter monitor October of this past year this was after being referred to cardiology  by Dr. Bruce.  Patient saw Dr. Brandon Valiente.  Patient also related her \"urine dumping episodes \"to the cardiologist at that time.    Objective      Visit Vitals  /80   Pulse 79   Wt 110 kg (242 lb)   BMI 39.06 kg/m²   Smoking Status Former   BSA 2.17 m²      Weight at home 242#.  Patient's weight in November was 253#.  Physical Exam  Vitals and nursing note reviewed.   Constitutional:       General: She is not in acute distress.     Appearance: She is well-developed.   HENT:      Mouth/Throat:      Mouth: Mucous membranes are moist.   Eyes:      Pupils: Pupils are equal, round, and reactive to light.   Neck:      Vascular: No carotid bruit.   Cardiovascular:      Rate and Rhythm: Normal rate and regular rhythm.      Pulses: Normal pulses.      Heart sounds: Normal heart sounds. No murmur heard.  Pulmonary:      Effort: Pulmonary effort is normal. No respiratory distress.      Breath sounds: Normal breath sounds.   Abdominal:      Palpations: Abdomen is soft.      Tenderness: There is no abdominal " tenderness.   Musculoskeletal:         General: No swelling.      Right lower leg: No edema.      Left lower leg: No edema.      Comments: Gait steady   Skin:     Findings: No rash.   Neurological:      Mental Status: She is alert and oriented to person, place, and time.   Psychiatric:         Mood and Affect: Mood normal.         Speech: Speech normal.         Behavior: Behavior normal.         Thought Content: Thought content normal.         Judgment: Judgment normal.      Comments: Expansive

## 2025-03-12 ENCOUNTER — VBI (OUTPATIENT)
Dept: ADMINISTRATIVE | Facility: OTHER | Age: 70
End: 2025-03-12

## 2025-03-12 NOTE — TELEPHONE ENCOUNTER
03/12/25 12:06 PM     Chart reviewed for Mammogram ; nothing is submitted to the patient's insurance at this time.     Donna Jane MA   [facility]

## 2025-03-17 ENCOUNTER — HOSPITAL ENCOUNTER (OUTPATIENT)
Dept: NON INVASIVE DIAGNOSTICS | Facility: HOSPITAL | Age: 70
Discharge: HOME | End: 2025-03-17
Payer: COMMERCIAL

## 2025-03-17 VITALS
HEIGHT: 66 IN | SYSTOLIC BLOOD PRESSURE: 124 MMHG | DIASTOLIC BLOOD PRESSURE: 80 MMHG | BODY MASS INDEX: 38.89 KG/M2 | HEART RATE: 79 BPM | WEIGHT: 242 LBS

## 2025-03-17 DIAGNOSIS — R00.2 PALPITATION: ICD-10-CM

## 2025-03-17 LAB
AORTIC ROOT: 3.4 CM
ASCENDING AORTA: 3.3 CM
BSA FOR ECHO PROCEDURE: 2.17 M2
E WAVE DECELERATION TIME: 129 MS
E/A RATIO: 1.07
FRACTIONAL SHORTENING: 33 (ref 28–44)
INTERVENTRICULAR SEPTUM IN DIASTOLE (PARASTERNAL SHORT AXIS VIEW): 1.3 CM
INTERVENTRICULAR SEPTUM: 1.3 CM (ref 0.6–1.1)
LAAS-AP2: 23.4 CM2
LAAS-AP4: 18.5 CM2
LEFT ATRIUM SIZE: 4 CM
LEFT ATRIUM VOLUME (MOD BIPLANE): 64 ML
LEFT ATRIUM VOLUME INDEX (MOD BIPLANE): 29.5 ML/M2
LEFT INTERNAL DIMENSION IN SYSTOLE: 3 CM (ref 2.1–4)
LEFT VENTRICULAR INTERNAL DIMENSION IN DIASTOLE: 4.5 CM (ref 3.5–6)
LEFT VENTRICULAR POSTERIOR WALL IN END DIASTOLE: 1.1 CM
LEFT VENTRICULAR STROKE VOLUME: 56 ML
LV EF US.2D.A4C+ESTIMATED: 61 %
LVSV (TEICH): 56 ML
MV E'TISSUE VEL-LAT: 12 CM/S
MV E'TISSUE VEL-SEP: 9 CM/S
MV PEAK A VEL: 0.87 M/S
MV PEAK E VEL: 93 CM/S
MV STENOSIS PRESSURE HALF TIME: 37 MS
MV VALVE AREA P 1/2 METHOD: 5.95
RA PRESSURE ESTIMATED: 3 MMHG
RIGHT ATRIUM AREA SYSTOLE A4C: 15.8 CM2
RIGHT VENTRICLE ID DIMENSION: 3.7 CM
RV PSP: 37 MMHG
SL CV LEFT ATRIUM LENGTH A2C: 5.9 CM
SL CV LV EF: 60
SL CV PED ECHO LEFT VENTRICLE DIASTOLIC VOLUME (MOD BIPLANE) 2D: 90 ML
SL CV PED ECHO LEFT VENTRICLE SYSTOLIC VOLUME (MOD BIPLANE) 2D: 35 ML
TR MAX PG: 34 MMHG
TR PEAK VELOCITY: 2.9 M/S
TRICUSPID ANNULAR PLANE SYSTOLIC EXCURSION: 2.7 CM
TRICUSPID VALVE PEAK REGURGITATION VELOCITY: 2.93 M/S

## 2025-03-17 PROCEDURE — 93306 TTE W/DOPPLER COMPLETE: CPT | Performed by: STUDENT IN AN ORGANIZED HEALTH CARE EDUCATION/TRAINING PROGRAM

## 2025-03-17 PROCEDURE — 93306 TTE W/DOPPLER COMPLETE: CPT

## 2025-03-19 ENCOUNTER — TELEPHONE (OUTPATIENT)
Age: 70
End: 2025-03-19

## 2025-03-19 NOTE — TELEPHONE ENCOUNTER
Patient calls to ask about her ECHO results. Patient cannot see results in her MyChart. Please review the results and then call the patient with the results. Patient can be reached at [phone].

## 2025-03-20 NOTE — TELEPHONE ENCOUNTER
Patient called and was informed that once provider reviews results she will be receiving a call back.

## 2025-03-21 ENCOUNTER — RESULTS FOLLOW-UP (OUTPATIENT)
Dept: FAMILY MEDICINE CLINIC | Facility: CLINIC | Age: 70
End: 2025-03-21

## 2025-03-21 NOTE — RESULT ENCOUNTER NOTE
As you might be able to see by the time stamp, I am a bit delayed in getting to my messages.  My mom's been in the hospital so it has been a bit hectic.  So here we go with the echo reviewed.  Your heart pump function is normal which is described as the ejection fraction.  60% is normal wall motion is normal and both systolic function and diastolic function.  Both the upper chambers (atria) are normal size.  The major vein, inferior vena cava coming back to the heart is normal.  All of the valves look pretty good as well.  There is some note of some mild regurgitation of mitral and tricuspid valve which causes a slight mild elevation of systolic pressure in the right ventricle but this is of no clinical significance.  The aortic valve looks good the pulmonic valve looks good.  The the ascending aorta which carries all of the blood out of the heart to the rest of the body looks normal.  So overall you have got a very structurally healthy looking heart.  So if you could summarize again for me some of your top 5 things that we need to, for lack of a better word, investigate, with possible blood work or specialty consultation, then we can move forward.  Again I apologize for the delay

## 2025-03-24 NOTE — TELEPHONE ENCOUNTER
Patient called in and can't get in mychart and is requesting results to be mailed to her. Please advise

## 2025-03-24 NOTE — TELEPHONE ENCOUNTER
Called and l/m for Ara letting her know that Dr. Johnston sent her a message through My Chart that she wanted to confirm she saw.  I asked pt to call back to confirm and also if she did not receive it, we would go over the message that was sent to her.

## 2025-03-24 NOTE — TELEPHONE ENCOUNTER
----- Message from Nahed Venegas,  sent at 3/24/2025  8:18 AM EDT -----  Patient has not seen/reviewed my result note.  Please review in detail

## 2025-03-26 NOTE — RESULT ENCOUNTER NOTE
It appears the patient has not read my MyChart note about the echocardiogram.  Can you please review in full.

## 2025-04-01 ENCOUNTER — TELEPHONE (OUTPATIENT)
Age: 70
End: 2025-04-01

## 2025-04-01 NOTE — RESULT ENCOUNTER NOTE
1.  The mitral regurgitation could take decades, if at all, to progress.  And I believe it has nothing to do with her current symptoms.  And I do not feel that anything on the echocardiogram shows any concerns with having a heart attack or stroke.  2.  I do not have an answer for sure about the sleeping on the left or right side.  3.  It is true that when we lay down all of the fluid from the daytime does come back to her heart and it pumps it to her kidneys and we do make more urine at nighttime.  That is why both men and women as we get older and our bladders do not have good control we have to get up several times.  However obviously your urination seems to be very excessive.  However on your last lab work your kidney function did seem to be normal.  But since you do notice that the blood pressure is elevated.  That would be indicative of something that the nephrologist may have an answer for.  Of course elevated sugars are more on the endocrine side but lets do one specialty at a time.  4. And and with regards to the leg cramping with all of the urinating, you are losing electrolytes like potassium, magnesium etc. which which is a common cause of leg cramps.

## 2025-04-01 NOTE — TELEPHONE ENCOUNTER
I called and spoke with the patient and reviewed the ECHO In full, she is stated she trying to find the correlation between her heart symptoms and the urine symptoms. She stated from 12 am - 3 am she can not sleep on her left side she has to sleep on her right side and then her heart starts to flutter and that lasts about 10 mins  and then the urination symptoms begin and she needs to go every hour to hour and a half and that does on all night. She stated then she wakes up feeling un well, fatigued her blood pressure is elevated as well as her sugars. She is having leg cramping from her thighs to her feet. She is concerned because the regurgitation is listed as mild and she knows the next step is that it will be severe and how long does that take. She investigated as this as you know and she does not want to have a heart attack or a stroke. She would like to know if you are still thinking about the two things from her last visit that this could be?

## 2025-04-01 NOTE — TELEPHONE ENCOUNTER
"Received call from patient regarding an echo she had done that was resulted by her PCP. She states her PCP told her the results were normal, but she read them on [facility] and saw that she has a \"leaky valve\". She states she looked up this result online and is very worried that it has not been addressed. She is requesting cardiology to review the results to discuss in further detail with her as she is worried she will need surgery to fix this.   "

## 2025-04-01 NOTE — TELEPHONE ENCOUNTER
Received meño from patient   Regarding echo results   Patient asking if she would need referral to nephology to rule out issues with kidneys given echo results

## 2025-04-03 NOTE — TELEPHONE ENCOUNTER
Pt called back. Gave provider's full message. Scheduled pt for an appointment on 4/17 with Dr. Loera.

## 2025-04-04 NOTE — TELEPHONE ENCOUNTER
"Patient concerned with her symptoms she has alone with the \"leaky valve\". She states between 2-3am she starts to have heart palpitations and then it triggers the urine and she is up for 10 hours urinating excessively. The urine is clear but she does have cases of incontinence from this. She would like a call back to discuss this further.   "

## 2025-04-04 NOTE — TELEPHONE ENCOUNTER
----- Message from Nahed Venegas, DO sent at 4/4/2025 12:27 AM EDT -----  I am nt sure if patient received this message?      4/1/25  4:28 PM  Note     1.  The mitral regurgitation could take decades, if at all, to progress.  And I believe it has nothing to do with her current symptoms.  And I do not feel that anything on the echocardiogram shows any concerns with having a heart attack or stroke.  2.  I do not have an answer for sure about the sleeping on the left or right side.  3.  It is true that when we lay down all of the fluid from the daytime does come back to her heart and it pumps it to her kidneys and we do make more urine at nighttime.  That is why both men and women as we get older and our bladders do not have good control we have to get up several times.  However obviously your urination seems to be very excessive.  However on your last lab work your kidney function did seem to be normal.  But since you do notice that the blood pressure is elevated.  That would be indicative of something that the nephrologist may have an answer for.  Of course elevated sugars are more on the endocrine side but lets do one specialty at a time.  4. And and with regards to the leg cramping with all of the urinating, you are losing electrolytes like potassium, magnesium etc. which which is a common cause of leg cramps.

## 2025-04-04 NOTE — TELEPHONE ENCOUNTER
Called Ara and l/lashawn that Dr. Johnston did respond to her through her My Chart.  It looks like this has not be read and so not sure pt is receiving these messages.  Pt asked to call the office back to review message if she is not able to retrieve it.

## 2025-04-05 ENCOUNTER — TELEPHONE (OUTPATIENT)
Dept: FAMILY MEDICINE CLINIC | Facility: CLINIC | Age: 70
End: 2025-04-05

## 2025-04-05 NOTE — TELEPHONE ENCOUNTER
"I believe that patient does not able to see [employer] messages and we are going back and forth with phone messages hopefully we can connect we can word for word read my message to her about the mitral valve etc. etc. her symptoms and referrals recommended.    Amena Leon MA   [place]    4/4/25  1:30 PM  Note     Called Ara and l/lashawn that Dr. Johnston did respond to her through her My Chart.  It looks like this has not be read and so not sure pt is receiving these messages.  Pt asked to call the office back to review message if she is not able to retrieve it.          4/4/25  1:32 PM  Ara Cabrales contacted CHELA Chin MA       4/4/25  1:36 PM  Note     Patient concerned with her symptoms she has alone with the \"leaky valve\". She states between 2-3am she starts to have heart palpitations and then it triggers the urine and she is up for 10 hours urinating excessively. The urine is clear but she does have cases of incontinence from this. She would like a call back to discuss this further.         Me       4/5/25 11:21 AM  Note     We have left messages with the patient.  I am not sure if she can see our messages through [employer].  But this is our last message about all of her symptoms and the referral that we are recommending.  Dionne Go MA routed this conversation to Me   Me       4/1/25  4:28 PM  Note     1.  The mitral regurgitation could take decades, if at all, to progress.  And I believe it has nothing to do with her current symptoms.  And I do not feel that anything on the echocardiogram shows any concerns with having a heart attack or stroke.  2.  I do not have an answer for sure about the sleeping on the left or right side.  3.  It is true that when we lay down all of the fluid from the daytime does come back to her heart and it pumps it to her kidneys and we do make more urine at nighttime.  That is why both men and women as we get older and our bladders do not have good " control we have to get up several times.  However obviously your urination seems to be very excessive.  However on your last lab work your kidney function did seem to be normal.  But since you do notice that the blood pressure is elevated.  That would be indicative of something that the nephrologist may have an answer for.  Of course elevated sugars are more on the endocrine side but lets do one specialty at a time.  4. And and with regards to the leg cramping with all of the urinating, you are losing electrolytes like potassium, magnesium etc. which which is a common cause of leg cramps.                 Additional Documentation

## 2025-04-05 NOTE — RESULT ENCOUNTER NOTE
We have left messages with the patient.  I am not sure if she can see our messages through [employer].  But this is our last message about all of her symptoms and the referral that we are recommending.  Dionne Go MA routed this conversation to Me   Me       4/1/25  4:28 PM  Note     1.  The mitral regurgitation could take decades, if at all, to progress.  And I believe it has nothing to do with her current symptoms.  And I do not feel that anything on the echocardiogram shows any concerns with having a heart attack or stroke.  2.  I do not have an answer for sure about the sleeping on the left or right side.  3.  It is true that when we lay down all of the fluid from the daytime does come back to her heart and it pumps it to her kidneys and we do make more urine at nighttime.  That is why both men and women as we get older and our bladders do not have good control we have to get up several times.  However obviously your urination seems to be very excessive.  However on your last lab work your kidney function did seem to be normal.  But since you do notice that the blood pressure is elevated.  That would be indicative of something that the nephrologist may have an answer for.  Of course elevated sugars are more on the endocrine side but lets do one specialty at a time.  4. And and with regards to the leg cramping with all of the urinating, you are losing electrolytes like potassium, magnesium etc. which which is a common cause of leg cramps.

## 2025-04-14 ENCOUNTER — TELEPHONE (OUTPATIENT)
Age: 70
End: 2025-04-14

## 2025-04-14 DIAGNOSIS — R35.89: ICD-10-CM

## 2025-04-14 DIAGNOSIS — R35.1 EXCESSIVE URINATION AT NIGHT: Primary | ICD-10-CM

## 2025-04-14 NOTE — TELEPHONE ENCOUNTER
Patient would like Dr Johnston to refer patient to a Nephrologist if that is what you think my be the issue. .  Please leave a message in MyChart.

## 2025-04-15 ENCOUNTER — TELEPHONE (OUTPATIENT)
Age: 70
End: 2025-04-15

## 2025-04-15 NOTE — TELEPHONE ENCOUNTER
Phone call from patient with referral to Nephrology for Excessive Urination at Night, and Increased Urine Volume. Please review chart to see if appropriate for Nephrology or does patient need to see Urology. Discussed with CTS prior to sending this message.     Patient agreed to wait for call with advised.

## 2025-04-16 NOTE — TELEPHONE ENCOUNTER
Called patient and left a voicemail letting her know that we have gone over her chart and it has been decided that it is in the best interest that she sees Urology and not Nephrology.     I asked if she has any further questions to please call.

## 2025-04-22 ENCOUNTER — TELEPHONE (OUTPATIENT)
Age: 70
End: 2025-04-22

## 2025-04-22 NOTE — TELEPHONE ENCOUNTER
New Patient   Insurance   Current Insurance? [employer]  Insurance E-verified? Yes    History   Reason for appointment/active symptoms?  Excessive urination at night  Increased urine volume     Has the patient had any previous Urologist(s)? No    Was the patient seen in the ED? No    Labs/Imaging(Including Out Of Network)? No    Records Requested?  Records Visible in EPIC? Yes    Personal history of cancer? No    Appointment   Office location preference: [place]    Appointment Details   Date: 5/8/2025  Time: 1:20pm  Location: [place]  Provider: Cleopatra Haines  Does the appointment need further review? No

## 2025-05-21 ENCOUNTER — VBI (OUTPATIENT)
Dept: ADMINISTRATIVE | Facility: OTHER | Age: 70
End: 2025-05-21

## 2025-05-21 NOTE — TELEPHONE ENCOUNTER
05/21/25 12:08 PM     Chart reviewed for Mammogram ; nothing is submitted to the patient's insurance at this time.     Carmen Pedroza MA   [facility]

## 2025-06-25 ENCOUNTER — TELEPHONE (OUTPATIENT)
Dept: FAMILY MEDICINE CLINIC | Facility: CLINIC | Age: 70
End: 2025-06-25

## 2025-06-25 NOTE — TELEPHONE ENCOUNTER
Called to schedule pt, due for annual pe, pt declined to schedule she states she does not need it at the moment.

## 2025-07-02 ENCOUNTER — TELEPHONE (OUTPATIENT)
Dept: FAMILY MEDICINE CLINIC | Facility: CLINIC | Age: 70
End: 2025-07-02

## 2025-07-02 NOTE — TELEPHONE ENCOUNTER
Type of form: Pt plan of care  via fax.            Forms have been placed in Dr. Bruce's folder to be completed for clinical staff.     Routing to clinical pool.

## 2025-07-03 ENCOUNTER — VBI (OUTPATIENT)
Dept: ADMINISTRATIVE | Facility: OTHER | Age: 70
End: 2025-07-03

## 2025-07-03 NOTE — TELEPHONE ENCOUNTER
07/03/25 9:58 AM     Chart reviewed for CRC: Colonoscopy ; nothing is submitted to the patient's insurance at this time.     Carmen Pedroza MA   [facility]

## 2025-08-04 ENCOUNTER — TELEPHONE (OUTPATIENT)
Age: 70
End: 2025-08-04